# Patient Record
Sex: FEMALE | Race: WHITE | NOT HISPANIC OR LATINO | URBAN - METROPOLITAN AREA
[De-identification: names, ages, dates, MRNs, and addresses within clinical notes are randomized per-mention and may not be internally consistent; named-entity substitution may affect disease eponyms.]

---

## 2017-03-21 ENCOUNTER — EMERGENCY (EMERGENCY)
Facility: HOSPITAL | Age: 67
LOS: 0 days | Discharge: HOME | End: 2017-03-22
Admitting: INTERNAL MEDICINE

## 2017-06-27 DIAGNOSIS — F10.129 ALCOHOL ABUSE WITH INTOXICATION, UNSPECIFIED: ICD-10-CM

## 2017-06-27 DIAGNOSIS — F17.200 NICOTINE DEPENDENCE, UNSPECIFIED, UNCOMPLICATED: ICD-10-CM

## 2017-06-27 DIAGNOSIS — Z91.012 ALLERGY TO EGGS: ICD-10-CM

## 2017-06-27 DIAGNOSIS — I25.10 ATHEROSCLEROTIC HEART DISEASE OF NATIVE CORONARY ARTERY WITHOUT ANGINA PECTORIS: ICD-10-CM

## 2017-06-27 DIAGNOSIS — Z79.82 LONG TERM (CURRENT) USE OF ASPIRIN: ICD-10-CM

## 2017-06-27 DIAGNOSIS — I25.2 OLD MYOCARDIAL INFARCTION: ICD-10-CM
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
## 2017-06-27 DIAGNOSIS — I10 ESSENTIAL (PRIMARY) HYPERTENSION: ICD-10-CM

## 2017-06-27 DIAGNOSIS — F20.9 SCHIZOPHRENIA, UNSPECIFIED: ICD-10-CM

## 2017-06-27 DIAGNOSIS — E78.5 HYPERLIPIDEMIA, UNSPECIFIED: ICD-10-CM

## 2017-06-27 DIAGNOSIS — R19.7 DIARRHEA, UNSPECIFIED: ICD-10-CM

## 2017-06-27 DIAGNOSIS — D64.9 ANEMIA, UNSPECIFIED: ICD-10-CM

## 2018-06-12 NOTE — ASU PATIENT PROFILE, ADULT - PMH
Abnormal cholesterol test    Absolute anemia    Acute MI    Acute schizophrenia    CAD, multiple vessel

## 2018-06-13 ENCOUNTER — OUTPATIENT (OUTPATIENT)
Dept: OUTPATIENT SERVICES | Facility: HOSPITAL | Age: 68
LOS: 1 days | Discharge: HOME | End: 2018-06-13

## 2018-06-13 VITALS — RESPIRATION RATE: 18 BRPM | HEART RATE: 60 BPM | SYSTOLIC BLOOD PRESSURE: 100 MMHG | DIASTOLIC BLOOD PRESSURE: 53 MMHG

## 2018-06-13 VITALS
TEMPERATURE: 96 F | HEIGHT: 63 IN | DIASTOLIC BLOOD PRESSURE: 62 MMHG | SYSTOLIC BLOOD PRESSURE: 130 MMHG | HEART RATE: 69 BPM | OXYGEN SATURATION: 97 % | RESPIRATION RATE: 17 BRPM | WEIGHT: 149.91 LBS

## 2018-06-13 DIAGNOSIS — Z95.0 PRESENCE OF CARDIAC PACEMAKER: Chronic | ICD-10-CM

## 2018-06-13 RX ORDER — SODIUM CHLORIDE 9 MG/ML
1000 INJECTION, SOLUTION INTRAVENOUS
Qty: 0 | Refills: 0 | Status: DISCONTINUED | OUTPATIENT
Start: 2018-06-13 | End: 2018-06-28

## 2018-06-13 RX ORDER — ACETAMINOPHEN 500 MG
650 TABLET ORAL ONCE
Qty: 0 | Refills: 0 | Status: DISCONTINUED | OUTPATIENT
Start: 2018-06-13 | End: 2018-06-28

## 2018-06-13 RX ORDER — ONDANSETRON 8 MG/1
4 TABLET, FILM COATED ORAL ONCE
Qty: 0 | Refills: 0 | Status: DISCONTINUED | OUTPATIENT
Start: 2018-06-13 | End: 2018-06-28

## 2018-06-13 NOTE — ASU PREOP CHECKLIST - TO WHOM
RN opportunity for questions and answers rendered [facility] [place] opportunity for questions and answers rendered

## 2018-06-15 DIAGNOSIS — H26.9 UNSPECIFIED CATARACT: ICD-10-CM

## 2018-06-15 DIAGNOSIS — I25.10 ATHEROSCLEROTIC HEART DISEASE OF NATIVE CORONARY ARTERY WITHOUT ANGINA PECTORIS: ICD-10-CM

## 2018-07-30 ENCOUNTER — OUTPATIENT (OUTPATIENT)
Dept: OUTPATIENT SERVICES | Facility: HOSPITAL | Age: 68
LOS: 1 days | Discharge: HOME | End: 2018-07-30

## 2018-07-30 DIAGNOSIS — Z01.21 ENCOUNTER FOR DENTAL EXAMINATION AND CLEANING WITH ABNORMAL FINDINGS: ICD-10-CM

## 2018-07-30 DIAGNOSIS — Z95.0 PRESENCE OF CARDIAC PACEMAKER: Chronic | ICD-10-CM

## 2018-07-30 PROBLEM — I25.10 ATHEROSCLEROTIC HEART DISEASE OF NATIVE CORONARY ARTERY WITHOUT ANGINA PECTORIS: Chronic | Status: ACTIVE | Noted: 2018-06-13

## 2018-07-30 PROBLEM — F23 BRIEF PSYCHOTIC DISORDER: Chronic | Status: ACTIVE | Noted: 2018-06-13

## 2018-07-30 PROBLEM — D64.9 ANEMIA, UNSPECIFIED: Chronic | Status: ACTIVE | Noted: 2018-06-13

## 2018-07-30 PROBLEM — E78.9 DISORDER OF LIPOPROTEIN METABOLISM, UNSPECIFIED: Chronic | Status: ACTIVE | Noted: 2018-06-13

## 2018-07-30 PROBLEM — I21.9 ACUTE MYOCARDIAL INFARCTION, UNSPECIFIED: Chronic | Status: ACTIVE | Noted: 2018-06-13

## 2018-10-03 ENCOUNTER — OUTPATIENT (OUTPATIENT)
Dept: OUTPATIENT SERVICES | Facility: HOSPITAL | Age: 68
LOS: 1 days | Discharge: HOME | End: 2018-10-03

## 2018-10-03 DIAGNOSIS — Z95.0 PRESENCE OF CARDIAC PACEMAKER: Chronic | ICD-10-CM

## 2018-10-03 DIAGNOSIS — K05.4 PERIODONTOSIS: ICD-10-CM

## 2018-11-12 ENCOUNTER — OUTPATIENT (OUTPATIENT)
Dept: OUTPATIENT SERVICES | Facility: HOSPITAL | Age: 68
LOS: 1 days | Discharge: HOME | End: 2018-11-12

## 2018-11-12 DIAGNOSIS — Z95.0 PRESENCE OF CARDIAC PACEMAKER: Chronic | ICD-10-CM

## 2018-11-26 DIAGNOSIS — K08.409 PARTIAL LOSS OF TEETH, UNSPECIFIED CAUSE, UNSPECIFIED CLASS: ICD-10-CM

## 2018-12-31 ENCOUNTER — OUTPATIENT (OUTPATIENT)
Dept: OUTPATIENT SERVICES | Facility: HOSPITAL | Age: 68
LOS: 1 days | Discharge: HOME | End: 2018-12-31

## 2018-12-31 DIAGNOSIS — Z95.0 PRESENCE OF CARDIAC PACEMAKER: Chronic | ICD-10-CM

## 2018-12-31 DIAGNOSIS — K08.409 PARTIAL LOSS OF TEETH, UNSPECIFIED CAUSE, UNSPECIFIED CLASS: ICD-10-CM

## 2019-06-21 ENCOUNTER — OUTPATIENT (OUTPATIENT)
Dept: OUTPATIENT SERVICES | Facility: HOSPITAL | Age: 69
LOS: 1 days | Discharge: HOME | End: 2019-06-21

## 2019-06-21 DIAGNOSIS — Z95.0 PRESENCE OF CARDIAC PACEMAKER: Chronic | ICD-10-CM

## 2019-07-05 DIAGNOSIS — Z01.21 ENCOUNTER FOR DENTAL EXAMINATION AND CLEANING WITH ABNORMAL FINDINGS: ICD-10-CM

## 2020-11-10 ENCOUNTER — OUTPATIENT (OUTPATIENT)
Dept: OUTPATIENT SERVICES | Facility: HOSPITAL | Age: 70
LOS: 1 days | Discharge: HOME | End: 2020-11-10

## 2020-11-10 DIAGNOSIS — Z95.0 PRESENCE OF CARDIAC PACEMAKER: Chronic | ICD-10-CM

## 2020-11-23 ENCOUNTER — OUTPATIENT (OUTPATIENT)
Dept: OUTPATIENT SERVICES | Facility: HOSPITAL | Age: 70
LOS: 1 days | Discharge: HOME | End: 2020-11-23

## 2020-11-23 DIAGNOSIS — Z95.0 PRESENCE OF CARDIAC PACEMAKER: Chronic | ICD-10-CM

## 2020-12-07 ENCOUNTER — OUTPATIENT (OUTPATIENT)
Dept: OUTPATIENT SERVICES | Facility: HOSPITAL | Age: 70
LOS: 1 days | Discharge: HOME | End: 2020-12-07

## 2020-12-07 DIAGNOSIS — Z95.0 PRESENCE OF CARDIAC PACEMAKER: Chronic | ICD-10-CM

## 2020-12-07 DIAGNOSIS — K05.6 PERIODONTAL DISEASE, UNSPECIFIED: ICD-10-CM

## 2021-01-04 ENCOUNTER — OUTPATIENT (OUTPATIENT)
Dept: OUTPATIENT SERVICES | Facility: HOSPITAL | Age: 71
LOS: 1 days | Discharge: HOME | End: 2021-01-04

## 2021-01-04 DIAGNOSIS — Z95.0 PRESENCE OF CARDIAC PACEMAKER: Chronic | ICD-10-CM

## 2021-01-04 DIAGNOSIS — K08.109 COMPLETE LOSS OF TEETH, UNSPECIFIED CAUSE, UNSPECIFIED CLASS: ICD-10-CM

## 2021-03-01 ENCOUNTER — OUTPATIENT (OUTPATIENT)
Dept: OUTPATIENT SERVICES | Facility: HOSPITAL | Age: 71
LOS: 1 days | Discharge: HOME | End: 2021-03-01

## 2021-03-01 DIAGNOSIS — Z95.0 PRESENCE OF CARDIAC PACEMAKER: Chronic | ICD-10-CM

## 2021-03-18 ENCOUNTER — OUTPATIENT (OUTPATIENT)
Dept: OUTPATIENT SERVICES | Facility: HOSPITAL | Age: 71
LOS: 1 days | Discharge: HOME | End: 2021-03-18
Payer: MEDICARE

## 2021-03-18 DIAGNOSIS — Z95.0 PRESENCE OF CARDIAC PACEMAKER: Chronic | ICD-10-CM

## 2021-03-18 PROCEDURE — 78803 RP LOCLZJ TUM SPECT 1 AREA: CPT | Mod: 26

## 2021-03-30 DIAGNOSIS — G25.0 ESSENTIAL TREMOR: ICD-10-CM

## 2021-04-19 ENCOUNTER — OUTPATIENT (OUTPATIENT)
Dept: OUTPATIENT SERVICES | Facility: HOSPITAL | Age: 71
LOS: 1 days | Discharge: HOME | End: 2021-04-19

## 2021-04-19 DIAGNOSIS — Z95.0 PRESENCE OF CARDIAC PACEMAKER: Chronic | ICD-10-CM

## 2021-04-20 DIAGNOSIS — K08.109 COMPLETE LOSS OF TEETH, UNSPECIFIED CAUSE, UNSPECIFIED CLASS: ICD-10-CM

## 2021-05-17 ENCOUNTER — OUTPATIENT (OUTPATIENT)
Dept: OUTPATIENT SERVICES | Facility: HOSPITAL | Age: 71
LOS: 1 days | Discharge: HOME | End: 2021-05-17

## 2021-05-17 DIAGNOSIS — Z95.0 PRESENCE OF CARDIAC PACEMAKER: Chronic | ICD-10-CM

## 2021-05-18 DIAGNOSIS — K08.409 PARTIAL LOSS OF TEETH, UNSPECIFIED CAUSE, UNSPECIFIED CLASS: ICD-10-CM

## 2021-08-23 ENCOUNTER — OUTPATIENT (OUTPATIENT)
Dept: OUTPATIENT SERVICES | Facility: HOSPITAL | Age: 71
LOS: 1 days | Discharge: HOME | End: 2021-08-23

## 2021-08-23 DIAGNOSIS — Z95.0 PRESENCE OF CARDIAC PACEMAKER: Chronic | ICD-10-CM

## 2021-11-29 ENCOUNTER — OUTPATIENT (OUTPATIENT)
Dept: OUTPATIENT SERVICES | Facility: HOSPITAL | Age: 71
LOS: 1 days | Discharge: HOME | End: 2021-11-29

## 2021-11-29 DIAGNOSIS — Z95.0 PRESENCE OF CARDIAC PACEMAKER: Chronic | ICD-10-CM

## 2023-01-01 ENCOUNTER — INPATIENT (INPATIENT)
Facility: HOSPITAL | Age: 73
LOS: 8 days | Discharge: NURSING, REHAB OR LONG-TERM CARE | DRG: 258 | End: 2023-09-24
Attending: STUDENT IN AN ORGANIZED HEALTH CARE EDUCATION/TRAINING PROGRAM | Admitting: STUDENT IN AN ORGANIZED HEALTH CARE EDUCATION/TRAINING PROGRAM
Payer: MEDICARE

## 2023-01-01 ENCOUNTER — APPOINTMENT (OUTPATIENT)
Dept: NEPHROLOGY | Facility: CLINIC | Age: 73
End: 2023-01-01

## 2023-01-01 ENCOUNTER — TRANSCRIPTION ENCOUNTER (OUTPATIENT)
Age: 73
End: 2023-01-01

## 2023-01-01 ENCOUNTER — APPOINTMENT (OUTPATIENT)
Dept: ELECTROPHYSIOLOGY | Facility: CLINIC | Age: 73
End: 2023-01-01

## 2023-01-01 ENCOUNTER — EMERGENCY (EMERGENCY)
Facility: HOSPITAL | Age: 73
LOS: 0 days | Discharge: HOME | End: 2023-09-07
Attending: EMERGENCY MEDICINE
Payer: MEDICARE

## 2023-01-01 VITALS
OXYGEN SATURATION: 100 % | DIASTOLIC BLOOD PRESSURE: 97 MMHG | RESPIRATION RATE: 18 BRPM | SYSTOLIC BLOOD PRESSURE: 142 MMHG | HEART RATE: 69 BPM

## 2023-01-01 VITALS
DIASTOLIC BLOOD PRESSURE: 89 MMHG | RESPIRATION RATE: 19 BRPM | HEART RATE: 63 BPM | TEMPERATURE: 97 F | SYSTOLIC BLOOD PRESSURE: 137 MMHG

## 2023-01-01 VITALS
RESPIRATION RATE: 18 BRPM | HEIGHT: 62 IN | SYSTOLIC BLOOD PRESSURE: 159 MMHG | TEMPERATURE: 98 F | DIASTOLIC BLOOD PRESSURE: 72 MMHG | WEIGHT: 143.96 LBS | HEART RATE: 69 BPM | OXYGEN SATURATION: 100 %

## 2023-01-01 VITALS
DIASTOLIC BLOOD PRESSURE: 78 MMHG | HEIGHT: 62 IN | HEART RATE: 65 BPM | WEIGHT: 149.91 LBS | OXYGEN SATURATION: 100 % | SYSTOLIC BLOOD PRESSURE: 123 MMHG | TEMPERATURE: 98 F | RESPIRATION RATE: 18 BRPM

## 2023-01-01 DIAGNOSIS — Z79.82 LONG TERM (CURRENT) USE OF ASPIRIN: ICD-10-CM

## 2023-01-01 DIAGNOSIS — I10 ESSENTIAL (PRIMARY) HYPERTENSION: ICD-10-CM

## 2023-01-01 DIAGNOSIS — Z20.822 CONTACT WITH AND (SUSPECTED) EXPOSURE TO COVID-19: ICD-10-CM

## 2023-01-01 DIAGNOSIS — Z95.0 PRESENCE OF CARDIAC PACEMAKER: Chronic | ICD-10-CM

## 2023-01-01 DIAGNOSIS — F20.9 SCHIZOPHRENIA, UNSPECIFIED: ICD-10-CM

## 2023-01-01 DIAGNOSIS — Z91.012 ALLERGY TO EGGS: ICD-10-CM

## 2023-01-01 DIAGNOSIS — I42.9 CARDIOMYOPATHY, UNSPECIFIED: ICD-10-CM

## 2023-01-01 DIAGNOSIS — Z86.2 PERSONAL HISTORY OF DISEASES OF THE BLOOD AND BLOOD-FORMING ORGANS AND CERTAIN DISORDERS INVOLVING THE IMMUNE MECHANISM: ICD-10-CM

## 2023-01-01 DIAGNOSIS — F17.200 NICOTINE DEPENDENCE, UNSPECIFIED, UNCOMPLICATED: ICD-10-CM

## 2023-01-01 DIAGNOSIS — I25.10 ATHEROSCLEROTIC HEART DISEASE OF NATIVE CORONARY ARTERY WITHOUT ANGINA PECTORIS: ICD-10-CM

## 2023-01-01 DIAGNOSIS — E87.20 ACIDOSIS, UNSPECIFIED: ICD-10-CM

## 2023-01-01 DIAGNOSIS — Z95.0 PRESENCE OF CARDIAC PACEMAKER: ICD-10-CM

## 2023-01-01 DIAGNOSIS — E78.5 HYPERLIPIDEMIA, UNSPECIFIED: ICD-10-CM

## 2023-01-01 DIAGNOSIS — I13.0 HYPERTENSIVE HEART AND CHRONIC KIDNEY DISEASE WITH HEART FAILURE AND STAGE 1 THROUGH STAGE 4 CHRONIC KIDNEY DISEASE, OR UNSPECIFIED CHRONIC KIDNEY DISEASE: ICD-10-CM

## 2023-01-01 DIAGNOSIS — E83.42 HYPOMAGNESEMIA: ICD-10-CM

## 2023-01-01 DIAGNOSIS — E87.1 HYPO-OSMOLALITY AND HYPONATREMIA: ICD-10-CM

## 2023-01-01 DIAGNOSIS — N18.30 CHRONIC KIDNEY DISEASE, STAGE 3 UNSPECIFIED: ICD-10-CM

## 2023-01-01 DIAGNOSIS — I50.21 ACUTE SYSTOLIC (CONGESTIVE) HEART FAILURE: ICD-10-CM

## 2023-01-01 DIAGNOSIS — J84.9 INTERSTITIAL PULMONARY DISEASE, UNSPECIFIED: ICD-10-CM

## 2023-01-01 DIAGNOSIS — I50.9 HEART FAILURE, UNSPECIFIED: ICD-10-CM

## 2023-01-01 LAB
ALBUMIN SERPL ELPH-MCNC: 3.1 G/DL — LOW (ref 3.5–5.2)
ALBUMIN SERPL ELPH-MCNC: 3.2 G/DL — LOW (ref 3.5–5.2)
ALBUMIN SERPL ELPH-MCNC: 3.2 G/DL — LOW (ref 3.5–5.2)
ALBUMIN SERPL ELPH-MCNC: 3.4 G/DL — LOW (ref 3.5–5.2)
ALBUMIN SERPL ELPH-MCNC: 3.4 G/DL — LOW (ref 3.5–5.2)
ALBUMIN SERPL ELPH-MCNC: 3.5 G/DL — SIGNIFICANT CHANGE UP (ref 3.5–5.2)
ALBUMIN SERPL ELPH-MCNC: 3.6 G/DL — SIGNIFICANT CHANGE UP (ref 3.5–5.2)
ALBUMIN SERPL ELPH-MCNC: 3.7 G/DL — SIGNIFICANT CHANGE UP (ref 3.5–5.2)
ALBUMIN SERPL ELPH-MCNC: 3.7 G/DL — SIGNIFICANT CHANGE UP (ref 3.5–5.2)
ALP SERPL-CCNC: 120 U/L — HIGH (ref 30–115)
ALP SERPL-CCNC: 133 U/L — HIGH (ref 30–115)
ALP SERPL-CCNC: 138 U/L — HIGH (ref 30–115)
ALP SERPL-CCNC: 140 U/L — HIGH (ref 30–115)
ALP SERPL-CCNC: 142 U/L — HIGH (ref 30–115)
ALP SERPL-CCNC: 154 U/L — HIGH (ref 30–115)
ALP SERPL-CCNC: 154 U/L — HIGH (ref 30–115)
ALP SERPL-CCNC: 156 U/L — HIGH (ref 30–115)
ALP SERPL-CCNC: 179 U/L — HIGH (ref 30–115)
ALT FLD-CCNC: 10 U/L — SIGNIFICANT CHANGE UP (ref 0–41)
ALT FLD-CCNC: 11 U/L — SIGNIFICANT CHANGE UP (ref 0–41)
ALT FLD-CCNC: 14 U/L — SIGNIFICANT CHANGE UP (ref 0–41)
ALT FLD-CCNC: 17 U/L — SIGNIFICANT CHANGE UP (ref 0–41)
ALT FLD-CCNC: 24 U/L — SIGNIFICANT CHANGE UP (ref 0–41)
ALT FLD-CCNC: 7 U/L — SIGNIFICANT CHANGE UP (ref 0–41)
ALT FLD-CCNC: 8 U/L — SIGNIFICANT CHANGE UP (ref 0–41)
ANION GAP SERPL CALC-SCNC: 11 MMOL/L — SIGNIFICANT CHANGE UP (ref 7–14)
ANION GAP SERPL CALC-SCNC: 11 MMOL/L — SIGNIFICANT CHANGE UP (ref 7–14)
ANION GAP SERPL CALC-SCNC: 12 MMOL/L — SIGNIFICANT CHANGE UP (ref 7–14)
ANION GAP SERPL CALC-SCNC: 13 MMOL/L — SIGNIFICANT CHANGE UP (ref 7–14)
ANION GAP SERPL CALC-SCNC: 15 MMOL/L — HIGH (ref 7–14)
ANION GAP SERPL CALC-SCNC: 18 MMOL/L — HIGH (ref 7–14)
ANION GAP SERPL CALC-SCNC: 19 MMOL/L — HIGH (ref 7–14)
ANION GAP SERPL CALC-SCNC: 9 MMOL/L — SIGNIFICANT CHANGE UP (ref 7–14)
AST SERPL-CCNC: 22 U/L — SIGNIFICANT CHANGE UP (ref 0–41)
AST SERPL-CCNC: 27 U/L — SIGNIFICANT CHANGE UP (ref 0–41)
AST SERPL-CCNC: 29 U/L — SIGNIFICANT CHANGE UP (ref 0–41)
AST SERPL-CCNC: 30 U/L — SIGNIFICANT CHANGE UP (ref 0–41)
AST SERPL-CCNC: 35 U/L — SIGNIFICANT CHANGE UP (ref 0–41)
AST SERPL-CCNC: 37 U/L — SIGNIFICANT CHANGE UP (ref 0–41)
AST SERPL-CCNC: 40 U/L — SIGNIFICANT CHANGE UP (ref 0–41)
AST SERPL-CCNC: 42 U/L — HIGH (ref 0–41)
AST SERPL-CCNC: 59 U/L — HIGH (ref 0–41)
BASE EXCESS BLDV CALC-SCNC: 2.5 MMOL/L — SIGNIFICANT CHANGE UP (ref -2–3)
BASOPHILS # BLD AUTO: 0 K/UL — SIGNIFICANT CHANGE UP (ref 0–0.2)
BASOPHILS # BLD AUTO: 0.04 K/UL — SIGNIFICANT CHANGE UP (ref 0–0.2)
BASOPHILS # BLD AUTO: 0.05 K/UL — SIGNIFICANT CHANGE UP (ref 0–0.2)
BASOPHILS # BLD AUTO: 0.07 K/UL — SIGNIFICANT CHANGE UP (ref 0–0.2)
BASOPHILS # BLD AUTO: 0.08 K/UL — SIGNIFICANT CHANGE UP (ref 0–0.2)
BASOPHILS # BLD AUTO: 0.08 K/UL — SIGNIFICANT CHANGE UP (ref 0–0.2)
BASOPHILS NFR BLD AUTO: 0 % — SIGNIFICANT CHANGE UP (ref 0–1)
BASOPHILS NFR BLD AUTO: 0.6 % — SIGNIFICANT CHANGE UP (ref 0–1)
BASOPHILS NFR BLD AUTO: 0.7 % — SIGNIFICANT CHANGE UP (ref 0–1)
BASOPHILS NFR BLD AUTO: 0.7 % — SIGNIFICANT CHANGE UP (ref 0–1)
BASOPHILS NFR BLD AUTO: 0.8 % — SIGNIFICANT CHANGE UP (ref 0–1)
BASOPHILS NFR BLD AUTO: 0.9 % — SIGNIFICANT CHANGE UP (ref 0–1)
BASOPHILS NFR BLD AUTO: 1.3 % — HIGH (ref 0–1)
BASOPHILS NFR BLD AUTO: 1.3 % — HIGH (ref 0–1)
BASOPHILS NFR BLD AUTO: 1.4 % — HIGH (ref 0–1)
BILIRUB SERPL-MCNC: 1.4 MG/DL — HIGH (ref 0.2–1.2)
BILIRUB SERPL-MCNC: 1.6 MG/DL — HIGH (ref 0.2–1.2)
BILIRUB SERPL-MCNC: 1.8 MG/DL — HIGH (ref 0.2–1.2)
BILIRUB SERPL-MCNC: 1.8 MG/DL — HIGH (ref 0.2–1.2)
BILIRUB SERPL-MCNC: 1.9 MG/DL — HIGH (ref 0.2–1.2)
BILIRUB SERPL-MCNC: 2.2 MG/DL — HIGH (ref 0.2–1.2)
BILIRUB SERPL-MCNC: 2.2 MG/DL — HIGH (ref 0.2–1.2)
BILIRUB SERPL-MCNC: 2.3 MG/DL — HIGH (ref 0.2–1.2)
BILIRUB SERPL-MCNC: 3.2 MG/DL — HIGH (ref 0.2–1.2)
BUN SERPL-MCNC: 13 MG/DL — SIGNIFICANT CHANGE UP (ref 10–20)
BUN SERPL-MCNC: 13 MG/DL — SIGNIFICANT CHANGE UP (ref 10–20)
BUN SERPL-MCNC: 14 MG/DL — SIGNIFICANT CHANGE UP (ref 10–20)
BUN SERPL-MCNC: 14 MG/DL — SIGNIFICANT CHANGE UP (ref 10–20)
BUN SERPL-MCNC: 15 MG/DL — SIGNIFICANT CHANGE UP (ref 10–20)
BUN SERPL-MCNC: 15 MG/DL — SIGNIFICANT CHANGE UP (ref 10–20)
BUN SERPL-MCNC: 17 MG/DL — SIGNIFICANT CHANGE UP (ref 10–20)
BUN SERPL-MCNC: 18 MG/DL — SIGNIFICANT CHANGE UP (ref 10–20)
BUN SERPL-MCNC: 19 MG/DL — SIGNIFICANT CHANGE UP (ref 10–20)
BUN SERPL-MCNC: 22 MG/DL — HIGH (ref 10–20)
BUN SERPL-MCNC: 24 MG/DL — HIGH (ref 10–20)
BUN SERPL-MCNC: 25 MG/DL — HIGH (ref 10–20)
BUN SERPL-MCNC: 27 MG/DL — HIGH (ref 10–20)
BUN SERPL-MCNC: 28 MG/DL — HIGH (ref 10–20)
CA-I SERPL-SCNC: 1.12 MMOL/L — LOW (ref 1.15–1.33)
CALCIUM SERPL-MCNC: 7.9 MG/DL — LOW (ref 8.4–10.5)
CALCIUM SERPL-MCNC: 8.3 MG/DL — LOW (ref 8.4–10.5)
CALCIUM SERPL-MCNC: 8.4 MG/DL — SIGNIFICANT CHANGE UP (ref 8.4–10.5)
CALCIUM SERPL-MCNC: 8.4 MG/DL — SIGNIFICANT CHANGE UP (ref 8.4–10.5)
CALCIUM SERPL-MCNC: 8.5 MG/DL — SIGNIFICANT CHANGE UP (ref 8.4–10.5)
CALCIUM SERPL-MCNC: 8.6 MG/DL — SIGNIFICANT CHANGE UP (ref 8.4–10.4)
CALCIUM SERPL-MCNC: 8.6 MG/DL — SIGNIFICANT CHANGE UP (ref 8.4–10.5)
CALCIUM SERPL-MCNC: 8.7 MG/DL — SIGNIFICANT CHANGE UP (ref 8.4–10.5)
CALCIUM SERPL-MCNC: 8.7 MG/DL — SIGNIFICANT CHANGE UP (ref 8.4–10.5)
CALCIUM SERPL-MCNC: 8.8 MG/DL — SIGNIFICANT CHANGE UP (ref 8.4–10.5)
CALCIUM SERPL-MCNC: 9 MG/DL — SIGNIFICANT CHANGE UP (ref 8.4–10.4)
CALCIUM SERPL-MCNC: 9 MG/DL — SIGNIFICANT CHANGE UP (ref 8.4–10.5)
CALCIUM SERPL-MCNC: 9 MG/DL — SIGNIFICANT CHANGE UP (ref 8.4–10.5)
CALCIUM SERPL-MCNC: 9.3 MG/DL — SIGNIFICANT CHANGE UP (ref 8.4–10.4)
CHLORIDE SERPL-SCNC: 100 MMOL/L — SIGNIFICANT CHANGE UP (ref 98–110)
CHLORIDE SERPL-SCNC: 87 MMOL/L — LOW (ref 98–110)
CHLORIDE SERPL-SCNC: 90 MMOL/L — LOW (ref 98–110)
CHLORIDE SERPL-SCNC: 91 MMOL/L — LOW (ref 98–110)
CHLORIDE SERPL-SCNC: 92 MMOL/L — LOW (ref 98–110)
CHLORIDE SERPL-SCNC: 93 MMOL/L — LOW (ref 98–110)
CHLORIDE SERPL-SCNC: 95 MMOL/L — LOW (ref 98–110)
CHLORIDE SERPL-SCNC: 96 MMOL/L — LOW (ref 98–110)
CHLORIDE SERPL-SCNC: 96 MMOL/L — LOW (ref 98–110)
CHLORIDE SERPL-SCNC: 97 MMOL/L — LOW (ref 98–110)
CHOLEST SERPL-MCNC: 73 MG/DL — SIGNIFICANT CHANGE UP
CO2 SERPL-SCNC: 23 MMOL/L — SIGNIFICANT CHANGE UP (ref 17–32)
CO2 SERPL-SCNC: 24 MMOL/L — SIGNIFICANT CHANGE UP (ref 17–32)
CO2 SERPL-SCNC: 25 MMOL/L — SIGNIFICANT CHANGE UP (ref 17–32)
CO2 SERPL-SCNC: 26 MMOL/L — SIGNIFICANT CHANGE UP (ref 17–32)
CO2 SERPL-SCNC: 27 MMOL/L — SIGNIFICANT CHANGE UP (ref 17–32)
CO2 SERPL-SCNC: 28 MMOL/L — SIGNIFICANT CHANGE UP (ref 17–32)
CO2 SERPL-SCNC: 30 MMOL/L — SIGNIFICANT CHANGE UP (ref 17–32)
CO2 SERPL-SCNC: 31 MMOL/L — SIGNIFICANT CHANGE UP (ref 17–32)
CO2 SERPL-SCNC: 33 MMOL/L — HIGH (ref 17–32)
CO2 SERPL-SCNC: 33 MMOL/L — HIGH (ref 17–32)
CREAT SERPL-MCNC: 0.7 MG/DL — SIGNIFICANT CHANGE UP (ref 0.7–1.5)
CREAT SERPL-MCNC: 0.8 MG/DL — SIGNIFICANT CHANGE UP (ref 0.7–1.5)
CREAT SERPL-MCNC: 0.9 MG/DL — SIGNIFICANT CHANGE UP (ref 0.7–1.5)
CREAT SERPL-MCNC: 0.9 MG/DL — SIGNIFICANT CHANGE UP (ref 0.7–1.5)
CREAT SERPL-MCNC: 1 MG/DL — SIGNIFICANT CHANGE UP (ref 0.7–1.5)
CREAT SERPL-MCNC: 1 MG/DL — SIGNIFICANT CHANGE UP (ref 0.7–1.5)
CREAT SERPL-MCNC: 1.1 MG/DL — SIGNIFICANT CHANGE UP (ref 0.7–1.5)
CREAT SERPL-MCNC: 1.2 MG/DL — SIGNIFICANT CHANGE UP (ref 0.7–1.5)
CREAT SERPL-MCNC: 1.3 MG/DL — SIGNIFICANT CHANGE UP (ref 0.7–1.5)
CREAT SERPL-MCNC: 1.3 MG/DL — SIGNIFICANT CHANGE UP (ref 0.7–1.5)
EGFR: 44 ML/MIN/1.73M2 — LOW
EGFR: 44 ML/MIN/1.73M2 — LOW
EGFR: 48 ML/MIN/1.73M2 — LOW
EGFR: 53 ML/MIN/1.73M2 — LOW
EGFR: 60 ML/MIN/1.73M2 — SIGNIFICANT CHANGE UP
EGFR: 60 ML/MIN/1.73M2 — SIGNIFICANT CHANGE UP
EGFR: 68 ML/MIN/1.73M2 — SIGNIFICANT CHANGE UP
EGFR: 68 ML/MIN/1.73M2 — SIGNIFICANT CHANGE UP
EGFR: 78 ML/MIN/1.73M2 — SIGNIFICANT CHANGE UP
EGFR: 92 ML/MIN/1.73M2 — SIGNIFICANT CHANGE UP
EOSINOPHIL # BLD AUTO: 0.08 K/UL — SIGNIFICANT CHANGE UP (ref 0–0.7)
EOSINOPHIL # BLD AUTO: 0.1 K/UL — SIGNIFICANT CHANGE UP (ref 0–0.7)
EOSINOPHIL # BLD AUTO: 0.1 K/UL — SIGNIFICANT CHANGE UP (ref 0–0.7)
EOSINOPHIL # BLD AUTO: 0.12 K/UL — SIGNIFICANT CHANGE UP (ref 0–0.7)
EOSINOPHIL # BLD AUTO: 0.12 K/UL — SIGNIFICANT CHANGE UP (ref 0–0.7)
EOSINOPHIL # BLD AUTO: 0.13 K/UL — SIGNIFICANT CHANGE UP (ref 0–0.7)
EOSINOPHIL # BLD AUTO: 0.14 K/UL — SIGNIFICANT CHANGE UP (ref 0–0.7)
EOSINOPHIL # BLD AUTO: 0.17 K/UL — SIGNIFICANT CHANGE UP (ref 0–0.7)
EOSINOPHIL # BLD AUTO: 0.19 K/UL — SIGNIFICANT CHANGE UP (ref 0–0.7)
EOSINOPHIL NFR BLD AUTO: 0.8 % — SIGNIFICANT CHANGE UP (ref 0–8)
EOSINOPHIL NFR BLD AUTO: 1.3 % — SIGNIFICANT CHANGE UP (ref 0–8)
EOSINOPHIL NFR BLD AUTO: 1.6 % — SIGNIFICANT CHANGE UP (ref 0–8)
EOSINOPHIL NFR BLD AUTO: 1.7 % — SIGNIFICANT CHANGE UP (ref 0–8)
EOSINOPHIL NFR BLD AUTO: 1.9 % — SIGNIFICANT CHANGE UP (ref 0–8)
EOSINOPHIL NFR BLD AUTO: 1.9 % — SIGNIFICANT CHANGE UP (ref 0–8)
EOSINOPHIL NFR BLD AUTO: 2.5 % — SIGNIFICANT CHANGE UP (ref 0–8)
EOSINOPHIL NFR BLD AUTO: 2.6 % — SIGNIFICANT CHANGE UP (ref 0–8)
EOSINOPHIL NFR BLD AUTO: 3.3 % — SIGNIFICANT CHANGE UP (ref 0–8)
GAS PNL BLDV: 131 MMOL/L — LOW (ref 136–145)
GAS PNL BLDV: SIGNIFICANT CHANGE UP
GAS PNL BLDV: SIGNIFICANT CHANGE UP
GLUCOSE SERPL-MCNC: 108 MG/DL — HIGH (ref 70–99)
GLUCOSE SERPL-MCNC: 113 MG/DL — HIGH (ref 70–99)
GLUCOSE SERPL-MCNC: 118 MG/DL — HIGH (ref 70–99)
GLUCOSE SERPL-MCNC: 120 MG/DL — HIGH (ref 70–99)
GLUCOSE SERPL-MCNC: 125 MG/DL — HIGH (ref 70–99)
GLUCOSE SERPL-MCNC: 127 MG/DL — HIGH (ref 70–99)
GLUCOSE SERPL-MCNC: 129 MG/DL — HIGH (ref 70–99)
GLUCOSE SERPL-MCNC: 130 MG/DL — HIGH (ref 70–99)
GLUCOSE SERPL-MCNC: 131 MG/DL — HIGH (ref 70–99)
GLUCOSE SERPL-MCNC: 131 MG/DL — HIGH (ref 70–99)
GLUCOSE SERPL-MCNC: 132 MG/DL — HIGH (ref 70–99)
GLUCOSE SERPL-MCNC: 134 MG/DL — HIGH (ref 70–99)
GLUCOSE SERPL-MCNC: 135 MG/DL — HIGH (ref 70–99)
GLUCOSE SERPL-MCNC: 78 MG/DL — SIGNIFICANT CHANGE UP (ref 70–99)
HCO3 BLDV-SCNC: 28 MMOL/L — SIGNIFICANT CHANGE UP (ref 22–29)
HCT VFR BLD CALC: 44 % — SIGNIFICANT CHANGE UP (ref 37–47)
HCT VFR BLD CALC: 44.3 % — SIGNIFICANT CHANGE UP (ref 37–47)
HCT VFR BLD CALC: 44.5 % — SIGNIFICANT CHANGE UP (ref 37–47)
HCT VFR BLD CALC: 45.2 % — SIGNIFICANT CHANGE UP (ref 37–47)
HCT VFR BLD CALC: 45.6 % — SIGNIFICANT CHANGE UP (ref 37–47)
HCT VFR BLD CALC: 45.9 % — SIGNIFICANT CHANGE UP (ref 37–47)
HCT VFR BLD CALC: 46.3 % — SIGNIFICANT CHANGE UP (ref 37–47)
HCT VFR BLD CALC: 47 % — SIGNIFICANT CHANGE UP (ref 37–47)
HCT VFR BLD CALC: 47.8 % — HIGH (ref 37–47)
HCT VFR BLD CALC: 49 % — HIGH (ref 37–47)
HCT VFR BLDA CALC: 45 % — SIGNIFICANT CHANGE UP (ref 39–51)
HCV AB S/CO SERPL IA: 0.04 COI — SIGNIFICANT CHANGE UP
HCV AB SERPL-IMP: SIGNIFICANT CHANGE UP
HDLC SERPL-MCNC: 28 MG/DL — LOW
HGB BLD CALC-MCNC: 14.9 G/DL — SIGNIFICANT CHANGE UP (ref 12.6–17.4)
HGB BLD-MCNC: 14.3 G/DL — SIGNIFICANT CHANGE UP (ref 12–16)
HGB BLD-MCNC: 14.5 G/DL — SIGNIFICANT CHANGE UP (ref 12–16)
HGB BLD-MCNC: 14.6 G/DL — SIGNIFICANT CHANGE UP (ref 12–16)
HGB BLD-MCNC: 14.8 G/DL — SIGNIFICANT CHANGE UP (ref 12–16)
HGB BLD-MCNC: 14.9 G/DL — SIGNIFICANT CHANGE UP (ref 12–16)
HGB BLD-MCNC: 14.9 G/DL — SIGNIFICANT CHANGE UP (ref 12–16)
HGB BLD-MCNC: 15.2 G/DL — SIGNIFICANT CHANGE UP (ref 12–16)
HGB BLD-MCNC: 15.2 G/DL — SIGNIFICANT CHANGE UP (ref 12–16)
HGB BLD-MCNC: 15.5 G/DL — SIGNIFICANT CHANGE UP (ref 12–16)
HGB BLD-MCNC: 15.9 G/DL — SIGNIFICANT CHANGE UP (ref 12–16)
IMM GRANULOCYTES NFR BLD AUTO: 0.3 % — SIGNIFICANT CHANGE UP (ref 0.1–0.3)
IMM GRANULOCYTES NFR BLD AUTO: 0.3 % — SIGNIFICANT CHANGE UP (ref 0.1–0.3)
IMM GRANULOCYTES NFR BLD AUTO: 0.4 % — HIGH (ref 0.1–0.3)
IMM GRANULOCYTES NFR BLD AUTO: 0.4 % — HIGH (ref 0.1–0.3)
IMM GRANULOCYTES NFR BLD AUTO: 0.5 % — HIGH (ref 0.1–0.3)
IMM GRANULOCYTES NFR BLD AUTO: 0.6 % — HIGH (ref 0.1–0.3)
IMM GRANULOCYTES NFR BLD AUTO: 0.7 % — HIGH (ref 0.1–0.3)
IMM GRANULOCYTES NFR BLD AUTO: 1 % — HIGH (ref 0.1–0.3)
LACTATE BLDV-MCNC: 2.8 MMOL/L — HIGH (ref 0.5–2)
LACTATE SERPL-SCNC: 1.7 MMOL/L — SIGNIFICANT CHANGE UP (ref 0.7–2)
LACTATE SERPL-SCNC: 1.7 MMOL/L — SIGNIFICANT CHANGE UP (ref 0.7–2)
LACTATE SERPL-SCNC: 2.6 MMOL/L — HIGH (ref 0.7–2)
LACTATE SERPL-SCNC: 3.7 MMOL/L — HIGH (ref 0.7–2)
LACTATE SERPL-SCNC: 3.7 MMOL/L — HIGH (ref 0.7–2)
LIPID PNL WITH DIRECT LDL SERPL: 32 MG/DL — SIGNIFICANT CHANGE UP
LYMPHOCYTES # BLD AUTO: 0.98 K/UL — LOW (ref 1.2–3.4)
LYMPHOCYTES # BLD AUTO: 1.43 K/UL — SIGNIFICANT CHANGE UP (ref 1.2–3.4)
LYMPHOCYTES # BLD AUTO: 1.51 K/UL — SIGNIFICANT CHANGE UP (ref 1.2–3.4)
LYMPHOCYTES # BLD AUTO: 1.54 K/UL — SIGNIFICANT CHANGE UP (ref 1.2–3.4)
LYMPHOCYTES # BLD AUTO: 1.58 K/UL — SIGNIFICANT CHANGE UP (ref 1.2–3.4)
LYMPHOCYTES # BLD AUTO: 1.61 K/UL — SIGNIFICANT CHANGE UP (ref 1.2–3.4)
LYMPHOCYTES # BLD AUTO: 1.63 K/UL — SIGNIFICANT CHANGE UP (ref 1.2–3.4)
LYMPHOCYTES # BLD AUTO: 1.66 K/UL — SIGNIFICANT CHANGE UP (ref 1.2–3.4)
LYMPHOCYTES # BLD AUTO: 1.8 K/UL — SIGNIFICANT CHANGE UP (ref 1.2–3.4)
LYMPHOCYTES # BLD AUTO: 14.8 % — LOW (ref 20.5–51.1)
LYMPHOCYTES # BLD AUTO: 19.5 % — LOW (ref 20.5–51.1)
LYMPHOCYTES # BLD AUTO: 20.2 % — LOW (ref 20.5–51.1)
LYMPHOCYTES # BLD AUTO: 20.6 % — SIGNIFICANT CHANGE UP (ref 20.5–51.1)
LYMPHOCYTES # BLD AUTO: 21.8 % — SIGNIFICANT CHANGE UP (ref 20.5–51.1)
LYMPHOCYTES # BLD AUTO: 22.6 % — SIGNIFICANT CHANGE UP (ref 20.5–51.1)
LYMPHOCYTES # BLD AUTO: 26.7 % — SIGNIFICANT CHANGE UP (ref 20.5–51.1)
LYMPHOCYTES # BLD AUTO: 29 % — SIGNIFICANT CHANGE UP (ref 20.5–51.1)
LYMPHOCYTES # BLD AUTO: 30.1 % — SIGNIFICANT CHANGE UP (ref 20.5–51.1)
MAGNESIUM SERPL-MCNC: 1.6 MG/DL — LOW (ref 1.8–2.4)
MAGNESIUM SERPL-MCNC: 1.7 MG/DL — LOW (ref 1.8–2.4)
MAGNESIUM SERPL-MCNC: 1.8 MG/DL — SIGNIFICANT CHANGE UP (ref 1.8–2.4)
MAGNESIUM SERPL-MCNC: 1.9 MG/DL — SIGNIFICANT CHANGE UP (ref 1.8–2.4)
MAGNESIUM SERPL-MCNC: 2 MG/DL — SIGNIFICANT CHANGE UP (ref 1.8–2.4)
MAGNESIUM SERPL-MCNC: 2.1 MG/DL — SIGNIFICANT CHANGE UP (ref 1.8–2.4)
MAGNESIUM SERPL-MCNC: 2.3 MG/DL — SIGNIFICANT CHANGE UP (ref 1.8–2.4)
MAGNESIUM SERPL-MCNC: 2.5 MG/DL — HIGH (ref 1.8–2.4)
MCHC RBC-ENTMCNC: 27.8 PG — SIGNIFICANT CHANGE UP (ref 27–31)
MCHC RBC-ENTMCNC: 27.8 PG — SIGNIFICANT CHANGE UP (ref 27–31)
MCHC RBC-ENTMCNC: 28.1 PG — SIGNIFICANT CHANGE UP (ref 27–31)
MCHC RBC-ENTMCNC: 28.2 PG — SIGNIFICANT CHANGE UP (ref 27–31)
MCHC RBC-ENTMCNC: 28.5 PG — SIGNIFICANT CHANGE UP (ref 27–31)
MCHC RBC-ENTMCNC: 28.6 PG — SIGNIFICANT CHANGE UP (ref 27–31)
MCHC RBC-ENTMCNC: 28.6 PG — SIGNIFICANT CHANGE UP (ref 27–31)
MCHC RBC-ENTMCNC: 28.7 PG — SIGNIFICANT CHANGE UP (ref 27–31)
MCHC RBC-ENTMCNC: 28.7 PG — SIGNIFICANT CHANGE UP (ref 27–31)
MCHC RBC-ENTMCNC: 29.1 PG — SIGNIFICANT CHANGE UP (ref 27–31)
MCHC RBC-ENTMCNC: 32.3 G/DL — SIGNIFICANT CHANGE UP (ref 32–37)
MCHC RBC-ENTMCNC: 32.4 G/DL — SIGNIFICANT CHANGE UP (ref 32–37)
MCHC RBC-ENTMCNC: 32.4 G/DL — SIGNIFICANT CHANGE UP (ref 32–37)
MCHC RBC-ENTMCNC: 32.5 G/DL — SIGNIFICANT CHANGE UP (ref 32–37)
MCHC RBC-ENTMCNC: 32.5 G/DL — SIGNIFICANT CHANGE UP (ref 32–37)
MCHC RBC-ENTMCNC: 32.7 G/DL — SIGNIFICANT CHANGE UP (ref 32–37)
MCHC RBC-ENTMCNC: 32.8 G/DL — SIGNIFICANT CHANGE UP (ref 32–37)
MCHC RBC-ENTMCNC: 32.8 G/DL — SIGNIFICANT CHANGE UP (ref 32–37)
MCV RBC AUTO: 84.8 FL — SIGNIFICANT CHANGE UP (ref 81–99)
MCV RBC AUTO: 85.6 FL — SIGNIFICANT CHANGE UP (ref 81–99)
MCV RBC AUTO: 85.8 FL — SIGNIFICANT CHANGE UP (ref 81–99)
MCV RBC AUTO: 86.4 FL — SIGNIFICANT CHANGE UP (ref 81–99)
MCV RBC AUTO: 87.6 FL — SIGNIFICANT CHANGE UP (ref 81–99)
MCV RBC AUTO: 87.7 FL — SIGNIFICANT CHANGE UP (ref 81–99)
MCV RBC AUTO: 88.1 FL — SIGNIFICANT CHANGE UP (ref 81–99)
MCV RBC AUTO: 88.3 FL — SIGNIFICANT CHANGE UP (ref 81–99)
MCV RBC AUTO: 88.4 FL — SIGNIFICANT CHANGE UP (ref 81–99)
MCV RBC AUTO: 89 FL — SIGNIFICANT CHANGE UP (ref 81–99)
MONOCYTES # BLD AUTO: 0.59 K/UL — SIGNIFICANT CHANGE UP (ref 0.1–0.6)
MONOCYTES # BLD AUTO: 0.61 K/UL — HIGH (ref 0.1–0.6)
MONOCYTES # BLD AUTO: 0.65 K/UL — HIGH (ref 0.1–0.6)
MONOCYTES # BLD AUTO: 0.68 K/UL — HIGH (ref 0.1–0.6)
MONOCYTES # BLD AUTO: 0.71 K/UL — HIGH (ref 0.1–0.6)
MONOCYTES # BLD AUTO: 0.75 K/UL — HIGH (ref 0.1–0.6)
MONOCYTES # BLD AUTO: 0.81 K/UL — HIGH (ref 0.1–0.6)
MONOCYTES NFR BLD AUTO: 10.2 % — HIGH (ref 1.7–9.3)
MONOCYTES NFR BLD AUTO: 12.2 % — HIGH (ref 1.7–9.3)
MONOCYTES NFR BLD AUTO: 12.3 % — HIGH (ref 1.7–9.3)
MONOCYTES NFR BLD AUTO: 16.7 % — HIGH (ref 1.7–9.3)
MONOCYTES NFR BLD AUTO: 6.7 % — SIGNIFICANT CHANGE UP (ref 1.7–9.3)
MONOCYTES NFR BLD AUTO: 7.1 % — SIGNIFICANT CHANGE UP (ref 1.7–9.3)
MONOCYTES NFR BLD AUTO: 8.5 % — SIGNIFICANT CHANGE UP (ref 1.7–9.3)
MONOCYTES NFR BLD AUTO: 9.8 % — HIGH (ref 1.7–9.3)
MONOCYTES NFR BLD AUTO: 9.9 % — HIGH (ref 1.7–9.3)
NEUTROPHILS # BLD AUTO: 2.39 K/UL — SIGNIFICANT CHANGE UP (ref 1.4–6.5)
NEUTROPHILS # BLD AUTO: 2.88 K/UL — SIGNIFICANT CHANGE UP (ref 1.4–6.5)
NEUTROPHILS # BLD AUTO: 3.18 K/UL — SIGNIFICANT CHANGE UP (ref 1.4–6.5)
NEUTROPHILS # BLD AUTO: 3.58 K/UL — SIGNIFICANT CHANGE UP (ref 1.4–6.5)
NEUTROPHILS # BLD AUTO: 4.24 K/UL — SIGNIFICANT CHANGE UP (ref 1.4–6.5)
NEUTROPHILS # BLD AUTO: 4.96 K/UL — SIGNIFICANT CHANGE UP (ref 1.4–6.5)
NEUTROPHILS # BLD AUTO: 5.27 K/UL — SIGNIFICANT CHANGE UP (ref 1.4–6.5)
NEUTROPHILS # BLD AUTO: 5.87 K/UL — SIGNIFICANT CHANGE UP (ref 1.4–6.5)
NEUTROPHILS # BLD AUTO: 7.37 K/UL — HIGH (ref 1.4–6.5)
NEUTROPHILS NFR BLD AUTO: 49.1 % — SIGNIFICANT CHANGE UP (ref 42.2–75.2)
NEUTROPHILS NFR BLD AUTO: 53.9 % — SIGNIFICANT CHANGE UP (ref 42.2–75.2)
NEUTROPHILS NFR BLD AUTO: 55.3 % — SIGNIFICANT CHANGE UP (ref 42.2–75.2)
NEUTROPHILS NFR BLD AUTO: 57.7 % — SIGNIFICANT CHANGE UP (ref 42.2–75.2)
NEUTROPHILS NFR BLD AUTO: 63.7 % — SIGNIFICANT CHANGE UP (ref 42.2–75.2)
NEUTROPHILS NFR BLD AUTO: 65.1 % — SIGNIFICANT CHANGE UP (ref 42.2–75.2)
NEUTROPHILS NFR BLD AUTO: 68.6 % — SIGNIFICANT CHANGE UP (ref 42.2–75.2)
NEUTROPHILS NFR BLD AUTO: 70.4 % — SIGNIFICANT CHANGE UP (ref 42.2–75.2)
NEUTROPHILS NFR BLD AUTO: 76.6 % — HIGH (ref 42.2–75.2)
NON HDL CHOLESTEROL: 45 MG/DL — SIGNIFICANT CHANGE UP
NRBC # BLD: 0 /100 WBCS — SIGNIFICANT CHANGE UP (ref 0–0)
NRBC # BLD: 1 /100 WBCS — HIGH (ref 0–0)
NT-PROBNP SERPL-SCNC: HIGH PG/ML (ref 0–300)
PCO2 BLDV: 45 MMHG — HIGH (ref 39–42)
PH BLDV: 7.4 — SIGNIFICANT CHANGE UP (ref 7.32–7.43)
PLATELET # BLD AUTO: 101 K/UL — LOW (ref 130–400)
PLATELET # BLD AUTO: 118 K/UL — LOW (ref 130–400)
PLATELET # BLD AUTO: 146 K/UL — SIGNIFICANT CHANGE UP (ref 130–400)
PLATELET # BLD AUTO: 146 K/UL — SIGNIFICANT CHANGE UP (ref 130–400)
PLATELET # BLD AUTO: 153 K/UL — SIGNIFICANT CHANGE UP (ref 130–400)
PLATELET # BLD AUTO: 159 K/UL — SIGNIFICANT CHANGE UP (ref 130–400)
PLATELET # BLD AUTO: 161 K/UL — SIGNIFICANT CHANGE UP (ref 130–400)
PLATELET # BLD AUTO: 169 K/UL — SIGNIFICANT CHANGE UP (ref 130–400)
PLATELET # BLD AUTO: 235 K/UL — SIGNIFICANT CHANGE UP (ref 130–400)
PLATELET # BLD AUTO: 54 K/UL — LOW (ref 130–400)
PMV BLD: SIGNIFICANT CHANGE UP (ref 7.4–10.4)
PO2 BLDV: 49 MMHG — SIGNIFICANT CHANGE UP
POTASSIUM BLDV-SCNC: 4 MMOL/L — SIGNIFICANT CHANGE UP (ref 3.5–5.1)
POTASSIUM SERPL-MCNC: 2.8 MMOL/L — LOW (ref 3.5–5)
POTASSIUM SERPL-MCNC: 3.3 MMOL/L — LOW (ref 3.5–5)
POTASSIUM SERPL-MCNC: 3.3 MMOL/L — LOW (ref 3.5–5)
POTASSIUM SERPL-MCNC: 3.4 MMOL/L — LOW (ref 3.5–5)
POTASSIUM SERPL-MCNC: 3.7 MMOL/L — SIGNIFICANT CHANGE UP (ref 3.5–5)
POTASSIUM SERPL-MCNC: 3.7 MMOL/L — SIGNIFICANT CHANGE UP (ref 3.5–5)
POTASSIUM SERPL-MCNC: 3.9 MMOL/L — SIGNIFICANT CHANGE UP (ref 3.5–5)
POTASSIUM SERPL-MCNC: 3.9 MMOL/L — SIGNIFICANT CHANGE UP (ref 3.5–5)
POTASSIUM SERPL-MCNC: 4.2 MMOL/L — SIGNIFICANT CHANGE UP (ref 3.5–5)
POTASSIUM SERPL-MCNC: 4.6 MMOL/L — SIGNIFICANT CHANGE UP (ref 3.5–5)
POTASSIUM SERPL-MCNC: 5.1 MMOL/L — HIGH (ref 3.5–5)
POTASSIUM SERPL-MCNC: SIGNIFICANT CHANGE UP MMOL/L (ref 3.5–5)
POTASSIUM SERPL-SCNC: 2.8 MMOL/L — LOW (ref 3.5–5)
POTASSIUM SERPL-SCNC: 3.3 MMOL/L — LOW (ref 3.5–5)
POTASSIUM SERPL-SCNC: 3.3 MMOL/L — LOW (ref 3.5–5)
POTASSIUM SERPL-SCNC: 3.4 MMOL/L — LOW (ref 3.5–5)
POTASSIUM SERPL-SCNC: 3.7 MMOL/L — SIGNIFICANT CHANGE UP (ref 3.5–5)
POTASSIUM SERPL-SCNC: 3.7 MMOL/L — SIGNIFICANT CHANGE UP (ref 3.5–5)
POTASSIUM SERPL-SCNC: 3.9 MMOL/L — SIGNIFICANT CHANGE UP (ref 3.5–5)
POTASSIUM SERPL-SCNC: 3.9 MMOL/L — SIGNIFICANT CHANGE UP (ref 3.5–5)
POTASSIUM SERPL-SCNC: 4.2 MMOL/L — SIGNIFICANT CHANGE UP (ref 3.5–5)
POTASSIUM SERPL-SCNC: 4.6 MMOL/L — SIGNIFICANT CHANGE UP (ref 3.5–5)
POTASSIUM SERPL-SCNC: 5.1 MMOL/L — HIGH (ref 3.5–5)
POTASSIUM SERPL-SCNC: SIGNIFICANT CHANGE UP MMOL/L (ref 3.5–5)
PROCALCITONIN SERPL-MCNC: 0.02 NG/ML — SIGNIFICANT CHANGE UP (ref 0.02–0.1)
PROT SERPL-MCNC: 6.9 G/DL — SIGNIFICANT CHANGE UP (ref 6–8)
PROT SERPL-MCNC: 6.9 G/DL — SIGNIFICANT CHANGE UP (ref 6–8)
PROT SERPL-MCNC: 7 G/DL — SIGNIFICANT CHANGE UP (ref 6–8)
PROT SERPL-MCNC: 7 G/DL — SIGNIFICANT CHANGE UP (ref 6–8)
PROT SERPL-MCNC: 7.4 G/DL — SIGNIFICANT CHANGE UP (ref 6–8)
PROT SERPL-MCNC: 7.5 G/DL — SIGNIFICANT CHANGE UP (ref 6–8)
PROT SERPL-MCNC: 7.5 G/DL — SIGNIFICANT CHANGE UP (ref 6–8)
PROT SERPL-MCNC: 7.6 G/DL — SIGNIFICANT CHANGE UP (ref 6–8)
PROT SERPL-MCNC: 7.9 G/DL — SIGNIFICANT CHANGE UP (ref 6–8)
RAPID RVP RESULT: SIGNIFICANT CHANGE UP
RBC # BLD: 5.02 M/UL — SIGNIFICANT CHANGE UP (ref 4.2–5.4)
RBC # BLD: 5.05 M/UL — SIGNIFICANT CHANGE UP (ref 4.2–5.4)
RBC # BLD: 5.08 M/UL — SIGNIFICANT CHANGE UP (ref 4.2–5.4)
RBC # BLD: 5.19 M/UL — SIGNIFICANT CHANGE UP (ref 4.2–5.4)
RBC # BLD: 5.25 M/UL — SIGNIFICANT CHANGE UP (ref 4.2–5.4)
RBC # BLD: 5.28 M/UL — SIGNIFICANT CHANGE UP (ref 4.2–5.4)
RBC # BLD: 5.32 M/UL — SIGNIFICANT CHANGE UP (ref 4.2–5.4)
RBC # BLD: 5.41 M/UL — HIGH (ref 4.2–5.4)
RBC # BLD: 5.56 M/UL — HIGH (ref 4.2–5.4)
RBC # BLD: 5.57 M/UL — HIGH (ref 4.2–5.4)
RBC # FLD: 19.3 % — HIGH (ref 11.5–14.5)
RBC # FLD: 19.5 % — HIGH (ref 11.5–14.5)
RBC # FLD: 19.7 % — HIGH (ref 11.5–14.5)
RBC # FLD: 19.7 % — HIGH (ref 11.5–14.5)
RBC # FLD: 19.8 % — HIGH (ref 11.5–14.5)
RBC # FLD: 19.9 % — HIGH (ref 11.5–14.5)
RBC # FLD: 20.1 % — HIGH (ref 11.5–14.5)
RBC # FLD: 20.1 % — HIGH (ref 11.5–14.5)
RBC # FLD: 20.2 % — HIGH (ref 11.5–14.5)
RBC # FLD: 20.3 % — HIGH (ref 11.5–14.5)
SAO2 % BLDV: 77 % — SIGNIFICANT CHANGE UP
SARS-COV-2 RNA SPEC QL NAA+PROBE: SIGNIFICANT CHANGE UP
SARS-COV-2 RNA SPEC QL NAA+PROBE: SIGNIFICANT CHANGE UP
SODIUM SERPL-SCNC: 129 MMOL/L — LOW (ref 135–146)
SODIUM SERPL-SCNC: 130 MMOL/L — LOW (ref 135–146)
SODIUM SERPL-SCNC: 132 MMOL/L — LOW (ref 135–146)
SODIUM SERPL-SCNC: 134 MMOL/L — LOW (ref 135–146)
SODIUM SERPL-SCNC: 135 MMOL/L — SIGNIFICANT CHANGE UP (ref 135–146)
SODIUM SERPL-SCNC: 135 MMOL/L — SIGNIFICANT CHANGE UP (ref 135–146)
SODIUM SERPL-SCNC: 136 MMOL/L — SIGNIFICANT CHANGE UP (ref 135–146)
SODIUM SERPL-SCNC: 137 MMOL/L — SIGNIFICANT CHANGE UP (ref 135–146)
SODIUM SERPL-SCNC: 137 MMOL/L — SIGNIFICANT CHANGE UP (ref 135–146)
SODIUM SERPL-SCNC: 138 MMOL/L — SIGNIFICANT CHANGE UP (ref 135–146)
SODIUM SERPL-SCNC: 139 MMOL/L — SIGNIFICANT CHANGE UP (ref 135–146)
SODIUM SERPL-SCNC: 139 MMOL/L — SIGNIFICANT CHANGE UP (ref 135–146)
TRIGL SERPL-MCNC: 63 MG/DL — SIGNIFICANT CHANGE UP
TROPONIN T SERPL-MCNC: <0.01 NG/ML — SIGNIFICANT CHANGE UP
WBC # BLD: 4.86 K/UL — SIGNIFICANT CHANGE UP (ref 4.8–10.8)
WBC # BLD: 5.26 K/UL — SIGNIFICANT CHANGE UP (ref 4.8–10.8)
WBC # BLD: 5.34 K/UL — SIGNIFICANT CHANGE UP (ref 4.8–10.8)
WBC # BLD: 5.76 K/UL — SIGNIFICANT CHANGE UP (ref 4.8–10.8)
WBC # BLD: 6.21 K/UL — SIGNIFICANT CHANGE UP (ref 4.8–10.8)
WBC # BLD: 6.67 K/UL — SIGNIFICANT CHANGE UP (ref 4.8–10.8)
WBC # BLD: 7.61 K/UL — SIGNIFICANT CHANGE UP (ref 4.8–10.8)
WBC # BLD: 7.67 K/UL — SIGNIFICANT CHANGE UP (ref 4.8–10.8)
WBC # BLD: 8.34 K/UL — SIGNIFICANT CHANGE UP (ref 4.8–10.8)
WBC # BLD: 9.64 K/UL — SIGNIFICANT CHANGE UP (ref 4.8–10.8)
WBC # FLD AUTO: 4.86 K/UL — SIGNIFICANT CHANGE UP (ref 4.8–10.8)
WBC # FLD AUTO: 5.26 K/UL — SIGNIFICANT CHANGE UP (ref 4.8–10.8)
WBC # FLD AUTO: 5.34 K/UL — SIGNIFICANT CHANGE UP (ref 4.8–10.8)
WBC # FLD AUTO: 5.76 K/UL — SIGNIFICANT CHANGE UP (ref 4.8–10.8)
WBC # FLD AUTO: 6.21 K/UL — SIGNIFICANT CHANGE UP (ref 4.8–10.8)
WBC # FLD AUTO: 6.67 K/UL — SIGNIFICANT CHANGE UP (ref 4.8–10.8)
WBC # FLD AUTO: 7.61 K/UL — SIGNIFICANT CHANGE UP (ref 4.8–10.8)
WBC # FLD AUTO: 7.67 K/UL — SIGNIFICANT CHANGE UP (ref 4.8–10.8)
WBC # FLD AUTO: 8.34 K/UL — SIGNIFICANT CHANGE UP (ref 4.8–10.8)
WBC # FLD AUTO: 9.64 K/UL — SIGNIFICANT CHANGE UP (ref 4.8–10.8)

## 2023-01-01 PROCEDURE — 85027 COMPLETE CBC AUTOMATED: CPT

## 2023-01-01 PROCEDURE — 99232 SBSQ HOSP IP/OBS MODERATE 35: CPT

## 2023-01-01 PROCEDURE — 92610 EVALUATE SWALLOWING FUNCTION: CPT | Mod: GN

## 2023-01-01 PROCEDURE — 87635 SARS-COV-2 COVID-19 AMP PRB: CPT

## 2023-01-01 PROCEDURE — C1889: CPT

## 2023-01-01 PROCEDURE — 93017 CV STRESS TEST TRACING ONLY: CPT

## 2023-01-01 PROCEDURE — 0225U NFCT DS DNA&RNA 21 SARSCOV2: CPT

## 2023-01-01 PROCEDURE — 71250 CT THORAX DX C-: CPT | Mod: 26

## 2023-01-01 PROCEDURE — 99285 EMERGENCY DEPT VISIT HI MDM: CPT | Mod: FS

## 2023-01-01 PROCEDURE — 83605 ASSAY OF LACTIC ACID: CPT

## 2023-01-01 PROCEDURE — 99233 SBSQ HOSP IP/OBS HIGH 50: CPT

## 2023-01-01 PROCEDURE — 99223 1ST HOSP IP/OBS HIGH 75: CPT | Mod: 57

## 2023-01-01 PROCEDURE — 93016 CV STRESS TEST SUPVJ ONLY: CPT

## 2023-01-01 PROCEDURE — 71045 X-RAY EXAM CHEST 1 VIEW: CPT | Mod: 26

## 2023-01-01 PROCEDURE — 33228 REMV&REPLC PM GEN DUAL LEAD: CPT

## 2023-01-01 PROCEDURE — 86803 HEPATITIS C AB TEST: CPT

## 2023-01-01 PROCEDURE — 93306 TTE W/DOPPLER COMPLETE: CPT | Mod: 26

## 2023-01-01 PROCEDURE — 83735 ASSAY OF MAGNESIUM: CPT

## 2023-01-01 PROCEDURE — 80053 COMPREHEN METABOLIC PANEL: CPT

## 2023-01-01 PROCEDURE — 86901 BLOOD TYPING SEROLOGIC RH(D): CPT

## 2023-01-01 PROCEDURE — 80048 BASIC METABOLIC PNL TOTAL CA: CPT

## 2023-01-01 PROCEDURE — 85025 COMPLETE CBC W/AUTO DIFF WBC: CPT

## 2023-01-01 PROCEDURE — 71045 X-RAY EXAM CHEST 1 VIEW: CPT

## 2023-01-01 PROCEDURE — A9500: CPT

## 2023-01-01 PROCEDURE — 78452 HT MUSCLE IMAGE SPECT MULT: CPT | Mod: 26

## 2023-01-01 PROCEDURE — 71250 CT THORAX DX C-: CPT

## 2023-01-01 PROCEDURE — 99223 1ST HOSP IP/OBS HIGH 75: CPT

## 2023-01-01 PROCEDURE — 86850 RBC ANTIBODY SCREEN: CPT

## 2023-01-01 PROCEDURE — 92526 ORAL FUNCTION THERAPY: CPT | Mod: GN

## 2023-01-01 PROCEDURE — 93010 ELECTROCARDIOGRAM REPORT: CPT

## 2023-01-01 PROCEDURE — 93280 PM DEVICE PROGR EVAL DUAL: CPT

## 2023-01-01 PROCEDURE — 86900 BLOOD TYPING SEROLOGIC ABO: CPT

## 2023-01-01 PROCEDURE — 93018 CV STRESS TEST I&R ONLY: CPT

## 2023-01-01 PROCEDURE — 93306 TTE W/DOPPLER COMPLETE: CPT

## 2023-01-01 PROCEDURE — 84132 ASSAY OF SERUM POTASSIUM: CPT

## 2023-01-01 PROCEDURE — 99284 EMERGENCY DEPT VISIT MOD MDM: CPT | Mod: FS

## 2023-01-01 PROCEDURE — 84295 ASSAY OF SERUM SODIUM: CPT

## 2023-01-01 PROCEDURE — 99283 EMERGENCY DEPT VISIT LOW MDM: CPT

## 2023-01-01 PROCEDURE — 82803 BLOOD GASES ANY COMBINATION: CPT

## 2023-01-01 PROCEDURE — 80061 LIPID PANEL: CPT

## 2023-01-01 PROCEDURE — 84145 PROCALCITONIN (PCT): CPT

## 2023-01-01 PROCEDURE — 97163 PT EVAL HIGH COMPLEX 45 MIN: CPT | Mod: GP

## 2023-01-01 PROCEDURE — 82330 ASSAY OF CALCIUM: CPT

## 2023-01-01 PROCEDURE — C1785: CPT

## 2023-01-01 PROCEDURE — 85018 HEMOGLOBIN: CPT

## 2023-01-01 PROCEDURE — 78452 HT MUSCLE IMAGE SPECT MULT: CPT

## 2023-01-01 PROCEDURE — 93280 PM DEVICE PROGR EVAL DUAL: CPT | Mod: 26

## 2023-01-01 PROCEDURE — 99233 SBSQ HOSP IP/OBS HIGH 50: CPT | Mod: 57

## 2023-01-01 PROCEDURE — 85014 HEMATOCRIT: CPT

## 2023-01-01 PROCEDURE — 97110 THERAPEUTIC EXERCISES: CPT | Mod: GP

## 2023-01-01 PROCEDURE — 36415 COLL VENOUS BLD VENIPUNCTURE: CPT

## 2023-01-01 PROCEDURE — 97530 THERAPEUTIC ACTIVITIES: CPT | Mod: GP

## 2023-01-01 PROCEDURE — 84484 ASSAY OF TROPONIN QUANT: CPT

## 2023-01-01 RX ORDER — CEPHALEXIN 500 MG
500 CAPSULE ORAL EVERY 12 HOURS
Refills: 0 | Status: COMPLETED | OUTPATIENT
Start: 2023-01-01 | End: 2023-01-01

## 2023-01-01 RX ORDER — FLUPHENAZINE HYDROCHLORIDE 1 MG/1
25 TABLET, FILM COATED ORAL
Refills: 0 | DISCHARGE

## 2023-01-01 RX ORDER — FUROSEMIDE 40 MG
40 TABLET ORAL
Refills: 0 | Status: DISCONTINUED | OUTPATIENT
Start: 2023-01-01 | End: 2023-01-01

## 2023-01-01 RX ORDER — POTASSIUM CHLORIDE 20 MEQ
20 PACKET (EA) ORAL ONCE
Refills: 0 | Status: COMPLETED | OUTPATIENT
Start: 2023-01-01 | End: 2023-01-01

## 2023-01-01 RX ORDER — OXYBUTYNIN CHLORIDE 5 MG
5 TABLET ORAL DAILY
Refills: 0 | Status: DISCONTINUED | OUTPATIENT
Start: 2023-01-01 | End: 2023-01-01

## 2023-01-01 RX ORDER — ENOXAPARIN SODIUM 100 MG/ML
40 INJECTION SUBCUTANEOUS EVERY 24 HOURS
Refills: 0 | Status: DISCONTINUED | OUTPATIENT
Start: 2023-01-01 | End: 2023-01-01

## 2023-01-01 RX ORDER — LISINOPRIL 2.5 MG/1
5 TABLET ORAL ONCE
Refills: 0 | Status: COMPLETED | OUTPATIENT
Start: 2023-01-01 | End: 2023-01-01

## 2023-01-01 RX ORDER — BENZTROPINE MESYLATE 1 MG
0.5 TABLET ORAL DAILY
Refills: 0 | Status: DISCONTINUED | OUTPATIENT
Start: 2023-01-01 | End: 2023-01-01

## 2023-01-01 RX ORDER — ONDANSETRON 8 MG/1
4 TABLET, FILM COATED ORAL EVERY 8 HOURS
Refills: 0 | Status: DISCONTINUED | OUTPATIENT
Start: 2023-01-01 | End: 2023-01-01

## 2023-01-01 RX ORDER — OXYBUTYNIN CHLORIDE 5 MG
1 TABLET ORAL
Qty: 0 | Refills: 0 | DISCHARGE

## 2023-01-01 RX ORDER — REGADENOSON 0.08 MG/ML
0.4 INJECTION, SOLUTION INTRAVENOUS ONCE
Refills: 0 | Status: DISCONTINUED | OUTPATIENT
Start: 2023-01-01 | End: 2023-01-01

## 2023-01-01 RX ORDER — FLUPHENAZINE HYDROCHLORIDE 1 MG/1
1 TABLET, FILM COATED ORAL
Qty: 0 | Refills: 0 | DISCHARGE

## 2023-01-01 RX ORDER — BENZTROPINE MESYLATE 1 MG
1 TABLET ORAL
Refills: 0 | DISCHARGE

## 2023-01-01 RX ORDER — OXYBUTYNIN CHLORIDE 5 MG
1 TABLET ORAL
Refills: 0 | DISCHARGE

## 2023-01-01 RX ORDER — ACETAMINOPHEN 500 MG
650 TABLET ORAL EVERY 6 HOURS
Refills: 0 | Status: DISCONTINUED | OUTPATIENT
Start: 2023-01-01 | End: 2023-01-01

## 2023-01-01 RX ORDER — FOLIC ACID 0.8 MG
1 TABLET ORAL DAILY
Refills: 0 | Status: DISCONTINUED | OUTPATIENT
Start: 2023-01-01 | End: 2023-01-01

## 2023-01-01 RX ORDER — FUROSEMIDE 40 MG
20 TABLET ORAL ONCE
Refills: 0 | Status: COMPLETED | OUTPATIENT
Start: 2023-01-01 | End: 2023-01-01

## 2023-01-01 RX ORDER — ATORVASTATIN CALCIUM 80 MG/1
10 TABLET, FILM COATED ORAL AT BEDTIME
Refills: 0 | Status: DISCONTINUED | OUTPATIENT
Start: 2023-01-01 | End: 2023-01-01

## 2023-01-01 RX ORDER — METOPROLOL TARTRATE 50 MG
1 TABLET ORAL
Qty: 0 | Refills: 0 | DISCHARGE

## 2023-01-01 RX ORDER — PANTOPRAZOLE SODIUM 20 MG/1
40 TABLET, DELAYED RELEASE ORAL
Refills: 0 | Status: DISCONTINUED | OUTPATIENT
Start: 2023-01-01 | End: 2023-01-01

## 2023-01-01 RX ORDER — FERROUS SULFATE 325(65) MG
1 TABLET ORAL
Refills: 0 | DISCHARGE

## 2023-01-01 RX ORDER — FOLIC ACID 0.8 MG
1 TABLET ORAL
Qty: 0 | Refills: 0 | DISCHARGE

## 2023-01-01 RX ORDER — MAGNESIUM SULFATE 500 MG/ML
2 VIAL (ML) INJECTION ONCE
Refills: 0 | Status: COMPLETED | OUTPATIENT
Start: 2023-01-01 | End: 2023-01-01

## 2023-01-01 RX ORDER — METOPROLOL TARTRATE 50 MG
25 TABLET ORAL
Refills: 0 | DISCHARGE

## 2023-01-01 RX ORDER — ATORVASTATIN CALCIUM 80 MG/1
1 TABLET, FILM COATED ORAL
Qty: 0 | Refills: 0 | DISCHARGE

## 2023-01-01 RX ORDER — POTASSIUM CHLORIDE 20 MEQ
20 PACKET (EA) ORAL
Refills: 0 | Status: COMPLETED | OUTPATIENT
Start: 2023-01-01 | End: 2023-01-01

## 2023-01-01 RX ORDER — METOPROLOL TARTRATE 50 MG
1 TABLET ORAL
Qty: 0 | Refills: 0 | DISCHARGE
Start: 2023-01-01

## 2023-01-01 RX ORDER — LISINOPRIL 2.5 MG/1
5 TABLET ORAL DAILY
Refills: 0 | Status: DISCONTINUED | OUTPATIENT
Start: 2023-01-01 | End: 2023-01-01

## 2023-01-01 RX ORDER — ASPIRIN/CALCIUM CARB/MAGNESIUM 324 MG
1 TABLET ORAL
Qty: 0 | Refills: 0 | DISCHARGE

## 2023-01-01 RX ORDER — AMANTADINE HCL 100 MG
1 CAPSULE ORAL
Refills: 0 | DISCHARGE

## 2023-01-01 RX ORDER — LANOLIN ALCOHOL/MO/W.PET/CERES
3 CREAM (GRAM) TOPICAL AT BEDTIME
Refills: 0 | Status: DISCONTINUED | OUTPATIENT
Start: 2023-01-01 | End: 2023-01-01

## 2023-01-01 RX ORDER — POTASSIUM CHLORIDE 20 MEQ
40 PACKET (EA) ORAL ONCE
Refills: 0 | Status: COMPLETED | OUTPATIENT
Start: 2023-01-01 | End: 2023-01-01

## 2023-01-01 RX ORDER — GUAIFENESIN/DEXTROMETHORPHAN 600MG-30MG
5 TABLET, EXTENDED RELEASE 12 HR ORAL ONCE
Refills: 0 | Status: COMPLETED | OUTPATIENT
Start: 2023-01-01 | End: 2023-01-01

## 2023-01-01 RX ORDER — HEPARIN SODIUM 5000 [USP'U]/ML
5000 INJECTION INTRAVENOUS; SUBCUTANEOUS EVERY 12 HOURS
Refills: 0 | Status: DISCONTINUED | OUTPATIENT
Start: 2023-01-01 | End: 2023-01-01

## 2023-01-01 RX ORDER — AMANTADINE HCL 100 MG
100 CAPSULE ORAL DAILY
Refills: 0 | Status: DISCONTINUED | OUTPATIENT
Start: 2023-01-01 | End: 2023-01-01

## 2023-01-01 RX ORDER — GLIMEPIRIDE 1 MG
1 TABLET ORAL
Refills: 0 | DISCHARGE

## 2023-01-01 RX ORDER — ASPIRIN/CALCIUM CARB/MAGNESIUM 324 MG
81 TABLET ORAL DAILY
Refills: 0 | Status: DISCONTINUED | OUTPATIENT
Start: 2023-01-01 | End: 2023-01-01

## 2023-01-01 RX ORDER — LISINOPRIL 2.5 MG/1
1 TABLET ORAL
Qty: 0 | Refills: 0 | DISCHARGE

## 2023-01-01 RX ORDER — FUROSEMIDE 40 MG
40 TABLET ORAL DAILY
Refills: 0 | Status: DISCONTINUED | OUTPATIENT
Start: 2023-01-01 | End: 2023-01-01

## 2023-01-01 RX ORDER — LANOLIN ALCOHOL/MO/W.PET/CERES
1 CREAM (GRAM) TOPICAL
Refills: 0 | DISCHARGE

## 2023-01-01 RX ORDER — POLYMYXIN B SULF/TRIMETHOPRIM 10000-1/ML
1 DROPS OPHTHALMIC (EYE)
Refills: 0 | DISCHARGE

## 2023-01-01 RX ORDER — BENZTROPINE MESYLATE 1 MG
1 TABLET ORAL
Qty: 0 | Refills: 0 | DISCHARGE

## 2023-01-01 RX ORDER — GABAPENTIN 400 MG/1
1 CAPSULE ORAL
Qty: 0 | Refills: 0 | DISCHARGE

## 2023-01-01 RX ORDER — METOPROLOL TARTRATE 50 MG
25 TABLET ORAL DAILY
Refills: 0 | Status: DISCONTINUED | OUTPATIENT
Start: 2023-01-01 | End: 2023-01-01

## 2023-01-01 RX ORDER — CEPHALEXIN 500 MG
1 CAPSULE ORAL
Qty: 5 | Refills: 0
Start: 2023-01-01 | End: 2023-01-01

## 2023-01-01 RX ORDER — VANCOMYCIN HCL 1 G
1000 VIAL (EA) INTRAVENOUS ONCE
Refills: 0 | Status: COMPLETED | OUTPATIENT
Start: 2023-01-01 | End: 2023-01-01

## 2023-01-01 RX ORDER — FERROUS FUMARATE 350(115)MG
1 TABLET ORAL
Refills: 0 | DISCHARGE

## 2023-01-01 RX ORDER — FLUOXETINE HCL 10 MG
1 CAPSULE ORAL
Qty: 0 | Refills: 0 | DISCHARGE

## 2023-01-01 RX ORDER — FLUOXETINE HCL 10 MG
10 CAPSULE ORAL DAILY
Refills: 0 | Status: DISCONTINUED | OUTPATIENT
Start: 2023-01-01 | End: 2023-01-01

## 2023-01-01 RX ORDER — PANTOPRAZOLE SODIUM 20 MG/1
1 TABLET, DELAYED RELEASE ORAL
Refills: 0 | DISCHARGE

## 2023-01-01 RX ORDER — METOPROLOL TARTRATE 50 MG
25 TABLET ORAL ONCE
Refills: 0 | Status: COMPLETED | OUTPATIENT
Start: 2023-01-01 | End: 2023-01-01

## 2023-01-01 RX ORDER — ROBINUL 0.2 MG/ML
1 INJECTION INTRAMUSCULAR; INTRAVENOUS
Refills: 0 | DISCHARGE

## 2023-01-01 RX ADMIN — ATORVASTATIN CALCIUM 10 MILLIGRAM(S): 80 TABLET, FILM COATED ORAL at 21:18

## 2023-01-01 RX ADMIN — Medication 5 MILLIGRAM(S): at 11:57

## 2023-01-01 RX ADMIN — ATORVASTATIN CALCIUM 10 MILLIGRAM(S): 80 TABLET, FILM COATED ORAL at 22:00

## 2023-01-01 RX ADMIN — Medication 100 MILLIGRAM(S): at 11:57

## 2023-01-01 RX ADMIN — Medication 500 MILLIGRAM(S): at 17:03

## 2023-01-01 RX ADMIN — Medication 5 MILLIGRAM(S): at 11:58

## 2023-01-01 RX ADMIN — LISINOPRIL 5 MILLIGRAM(S): 2.5 TABLET ORAL at 05:45

## 2023-01-01 RX ADMIN — Medication 1 MILLIGRAM(S): at 11:21

## 2023-01-01 RX ADMIN — ATORVASTATIN CALCIUM 10 MILLIGRAM(S): 80 TABLET, FILM COATED ORAL at 21:35

## 2023-01-01 RX ADMIN — Medication 40 MILLIGRAM(S): at 17:03

## 2023-01-01 RX ADMIN — Medication 0.5 MILLIGRAM(S): at 13:56

## 2023-01-01 RX ADMIN — LISINOPRIL 5 MILLIGRAM(S): 2.5 TABLET ORAL at 05:52

## 2023-01-01 RX ADMIN — PANTOPRAZOLE SODIUM 40 MILLIGRAM(S): 20 TABLET, DELAYED RELEASE ORAL at 21:56

## 2023-01-01 RX ADMIN — Medication 25 GRAM(S): at 12:29

## 2023-01-01 RX ADMIN — Medication 10 MILLIGRAM(S): at 11:22

## 2023-01-01 RX ADMIN — Medication 500 MILLIGRAM(S): at 05:47

## 2023-01-01 RX ADMIN — PANTOPRAZOLE SODIUM 40 MILLIGRAM(S): 20 TABLET, DELAYED RELEASE ORAL at 17:11

## 2023-01-01 RX ADMIN — Medication 40 MILLIGRAM(S): at 05:44

## 2023-01-01 RX ADMIN — LISINOPRIL 5 MILLIGRAM(S): 2.5 TABLET ORAL at 06:26

## 2023-01-01 RX ADMIN — Medication 25 MILLIGRAM(S): at 06:23

## 2023-01-01 RX ADMIN — PANTOPRAZOLE SODIUM 40 MILLIGRAM(S): 20 TABLET, DELAYED RELEASE ORAL at 05:54

## 2023-01-01 RX ADMIN — Medication 0.5 MILLIGRAM(S): at 11:42

## 2023-01-01 RX ADMIN — Medication 100 MILLIGRAM(S): at 12:25

## 2023-01-01 RX ADMIN — Medication 100 MILLIGRAM(S): at 11:22

## 2023-01-01 RX ADMIN — Medication 250 MILLIGRAM(S): at 12:49

## 2023-01-01 RX ADMIN — Medication 10 MILLIGRAM(S): at 14:06

## 2023-01-01 RX ADMIN — Medication 0.5 MILLIGRAM(S): at 14:23

## 2023-01-01 RX ADMIN — ENOXAPARIN SODIUM 40 MILLIGRAM(S): 100 INJECTION SUBCUTANEOUS at 12:26

## 2023-01-01 RX ADMIN — Medication 10 MILLIGRAM(S): at 11:59

## 2023-01-01 RX ADMIN — Medication 25 MILLIGRAM(S): at 05:51

## 2023-01-01 RX ADMIN — Medication 40 MILLIGRAM(S): at 06:29

## 2023-01-01 RX ADMIN — PANTOPRAZOLE SODIUM 40 MILLIGRAM(S): 20 TABLET, DELAYED RELEASE ORAL at 06:36

## 2023-01-01 RX ADMIN — Medication 1 MILLIGRAM(S): at 14:24

## 2023-01-01 RX ADMIN — Medication 50 MILLIEQUIVALENT(S): at 11:41

## 2023-01-01 RX ADMIN — Medication 5 MILLILITER(S): at 03:57

## 2023-01-01 RX ADMIN — Medication 5 MILLIGRAM(S): at 11:42

## 2023-01-01 RX ADMIN — PANTOPRAZOLE SODIUM 40 MILLIGRAM(S): 20 TABLET, DELAYED RELEASE ORAL at 06:23

## 2023-01-01 RX ADMIN — Medication 81 MILLIGRAM(S): at 12:28

## 2023-01-01 RX ADMIN — Medication 40 MILLIGRAM(S): at 05:26

## 2023-01-01 RX ADMIN — Medication 100 MILLIGRAM(S): at 13:56

## 2023-01-01 RX ADMIN — LISINOPRIL 5 MILLIGRAM(S): 2.5 TABLET ORAL at 14:39

## 2023-01-01 RX ADMIN — Medication 81 MILLIGRAM(S): at 14:23

## 2023-01-01 RX ADMIN — LISINOPRIL 5 MILLIGRAM(S): 2.5 TABLET ORAL at 05:54

## 2023-01-01 RX ADMIN — PANTOPRAZOLE SODIUM 40 MILLIGRAM(S): 20 TABLET, DELAYED RELEASE ORAL at 05:45

## 2023-01-01 RX ADMIN — Medication 5 MILLIGRAM(S): at 12:25

## 2023-01-01 RX ADMIN — Medication 500 MILLIGRAM(S): at 05:51

## 2023-01-01 RX ADMIN — Medication 40 MILLIGRAM(S): at 14:06

## 2023-01-01 RX ADMIN — PANTOPRAZOLE SODIUM 40 MILLIGRAM(S): 20 TABLET, DELAYED RELEASE ORAL at 05:57

## 2023-01-01 RX ADMIN — Medication 1 MILLIGRAM(S): at 12:28

## 2023-01-01 RX ADMIN — Medication 50 MILLIEQUIVALENT(S): at 13:59

## 2023-01-01 RX ADMIN — LISINOPRIL 5 MILLIGRAM(S): 2.5 TABLET ORAL at 06:33

## 2023-01-01 RX ADMIN — Medication 40 MILLIGRAM(S): at 05:56

## 2023-01-01 RX ADMIN — Medication 25 MILLIGRAM(S): at 14:39

## 2023-01-01 RX ADMIN — Medication 1 MILLIGRAM(S): at 13:54

## 2023-01-01 RX ADMIN — Medication 10 MILLIGRAM(S): at 11:06

## 2023-01-01 RX ADMIN — PANTOPRAZOLE SODIUM 40 MILLIGRAM(S): 20 TABLET, DELAYED RELEASE ORAL at 17:01

## 2023-01-01 RX ADMIN — ATORVASTATIN CALCIUM 10 MILLIGRAM(S): 80 TABLET, FILM COATED ORAL at 21:29

## 2023-01-01 RX ADMIN — Medication 500 MILLIGRAM(S): at 17:02

## 2023-01-01 RX ADMIN — Medication 40 MILLIGRAM(S): at 06:22

## 2023-01-01 RX ADMIN — Medication 50 MILLIEQUIVALENT(S): at 17:01

## 2023-01-01 RX ADMIN — Medication 1 MILLIGRAM(S): at 11:57

## 2023-01-01 RX ADMIN — Medication 500 MILLIGRAM(S): at 05:26

## 2023-01-01 RX ADMIN — PANTOPRAZOLE SODIUM 40 MILLIGRAM(S): 20 TABLET, DELAYED RELEASE ORAL at 17:03

## 2023-01-01 RX ADMIN — Medication 25 GRAM(S): at 11:08

## 2023-01-01 RX ADMIN — Medication 500 MILLIGRAM(S): at 05:54

## 2023-01-01 RX ADMIN — Medication 25 MILLIGRAM(S): at 06:36

## 2023-01-01 RX ADMIN — Medication 0.5 MILLIGRAM(S): at 11:22

## 2023-01-01 RX ADMIN — Medication 5 MILLIGRAM(S): at 11:12

## 2023-01-01 RX ADMIN — Medication 0.5 MILLIGRAM(S): at 12:25

## 2023-01-01 RX ADMIN — Medication 25 MILLIGRAM(S): at 05:26

## 2023-01-01 RX ADMIN — Medication 81 MILLIGRAM(S): at 11:43

## 2023-01-01 RX ADMIN — Medication 100 MILLIGRAM(S): at 11:05

## 2023-01-01 RX ADMIN — Medication 0.5 MILLIGRAM(S): at 11:57

## 2023-01-01 RX ADMIN — Medication 25 MILLIGRAM(S): at 05:44

## 2023-01-01 RX ADMIN — ATORVASTATIN CALCIUM 10 MILLIGRAM(S): 80 TABLET, FILM COATED ORAL at 21:20

## 2023-01-01 RX ADMIN — Medication 10 MILLIGRAM(S): at 11:57

## 2023-01-01 RX ADMIN — Medication 40 MILLIGRAM(S): at 13:53

## 2023-01-01 RX ADMIN — Medication 50 MILLIEQUIVALENT(S): at 01:42

## 2023-01-01 RX ADMIN — Medication 5 MILLIGRAM(S): at 13:55

## 2023-01-01 RX ADMIN — Medication 10 MILLIGRAM(S): at 11:42

## 2023-01-01 RX ADMIN — Medication 81 MILLIGRAM(S): at 11:21

## 2023-01-01 RX ADMIN — Medication 25 MILLIGRAM(S): at 05:48

## 2023-01-01 RX ADMIN — Medication 25 MILLIGRAM(S): at 05:54

## 2023-01-01 RX ADMIN — Medication 100 MILLIGRAM(S): at 11:42

## 2023-01-01 RX ADMIN — Medication 100 MILLIGRAM(S): at 11:58

## 2023-01-01 RX ADMIN — Medication 81 MILLIGRAM(S): at 11:06

## 2023-01-01 RX ADMIN — Medication 20 MILLIGRAM(S): at 05:54

## 2023-01-01 RX ADMIN — Medication 10 MILLIGRAM(S): at 13:55

## 2023-01-01 RX ADMIN — HEPARIN SODIUM 5000 UNIT(S): 5000 INJECTION INTRAVENOUS; SUBCUTANEOUS at 21:20

## 2023-01-01 RX ADMIN — Medication 50 MILLIEQUIVALENT(S): at 11:05

## 2023-01-01 RX ADMIN — Medication 1 MILLIGRAM(S): at 11:43

## 2023-01-01 RX ADMIN — Medication 40 MILLIGRAM(S): at 05:51

## 2023-01-01 RX ADMIN — Medication 81 MILLIGRAM(S): at 11:58

## 2023-01-01 RX ADMIN — Medication 40 MILLIEQUIVALENT(S): at 06:36

## 2023-01-01 RX ADMIN — Medication 5 MILLILITER(S): at 05:47

## 2023-01-01 RX ADMIN — Medication 20 MILLIGRAM(S): at 00:30

## 2023-01-01 RX ADMIN — ENOXAPARIN SODIUM 40 MILLIGRAM(S): 100 INJECTION SUBCUTANEOUS at 12:00

## 2023-01-01 RX ADMIN — Medication 20 MILLIGRAM(S): at 05:45

## 2023-01-01 RX ADMIN — Medication 5 MILLIGRAM(S): at 11:22

## 2023-01-01 RX ADMIN — Medication 25 GRAM(S): at 12:00

## 2023-01-01 RX ADMIN — PANTOPRAZOLE SODIUM 40 MILLIGRAM(S): 20 TABLET, DELAYED RELEASE ORAL at 05:48

## 2023-01-01 RX ADMIN — Medication 5 MILLILITER(S): at 05:52

## 2023-01-01 RX ADMIN — Medication 0.5 MILLIGRAM(S): at 11:07

## 2023-01-01 RX ADMIN — Medication 20 MILLIGRAM(S): at 17:42

## 2023-01-01 RX ADMIN — Medication 0.5 MILLIGRAM(S): at 11:58

## 2023-01-01 RX ADMIN — ATORVASTATIN CALCIUM 10 MILLIGRAM(S): 80 TABLET, FILM COATED ORAL at 22:25

## 2023-01-01 RX ADMIN — Medication 81 MILLIGRAM(S): at 13:54

## 2023-01-01 RX ADMIN — Medication 20 MILLIGRAM(S): at 05:48

## 2023-01-01 RX ADMIN — Medication 1 MILLIGRAM(S): at 11:06

## 2023-01-01 RX ADMIN — PANTOPRAZOLE SODIUM 40 MILLIGRAM(S): 20 TABLET, DELAYED RELEASE ORAL at 05:51

## 2023-01-01 RX ADMIN — LISINOPRIL 5 MILLIGRAM(S): 2.5 TABLET ORAL at 05:44

## 2023-01-01 RX ADMIN — Medication 500 MILLIGRAM(S): at 21:19

## 2023-01-01 RX ADMIN — PANTOPRAZOLE SODIUM 40 MILLIGRAM(S): 20 TABLET, DELAYED RELEASE ORAL at 05:26

## 2023-01-01 RX ADMIN — Medication 500 MILLIGRAM(S): at 16:02

## 2023-01-01 RX ADMIN — Medication 5 MILLIGRAM(S): at 14:06

## 2023-01-01 RX ADMIN — Medication 100 MILLIGRAM(S): at 14:06

## 2023-01-01 RX ADMIN — PANTOPRAZOLE SODIUM 40 MILLIGRAM(S): 20 TABLET, DELAYED RELEASE ORAL at 17:15

## 2023-01-01 RX ADMIN — Medication 25 GRAM(S): at 11:05

## 2023-01-01 RX ADMIN — PANTOPRAZOLE SODIUM 40 MILLIGRAM(S): 20 TABLET, DELAYED RELEASE ORAL at 17:02

## 2023-01-01 RX ADMIN — ATORVASTATIN CALCIUM 10 MILLIGRAM(S): 80 TABLET, FILM COATED ORAL at 23:07

## 2023-01-01 RX ADMIN — Medication 50 MILLIEQUIVALENT(S): at 17:18

## 2023-01-01 RX ADMIN — LISINOPRIL 5 MILLIGRAM(S): 2.5 TABLET ORAL at 05:57

## 2023-01-01 RX ADMIN — HEPARIN SODIUM 5000 UNIT(S): 5000 INJECTION INTRAVENOUS; SUBCUTANEOUS at 05:45

## 2023-01-01 RX ADMIN — Medication 50 MILLIEQUIVALENT(S): at 11:08

## 2023-01-01 RX ADMIN — Medication 10 MILLIGRAM(S): at 12:24

## 2023-01-01 RX ADMIN — Medication 25 MILLIGRAM(S): at 05:56

## 2023-01-01 RX ADMIN — PANTOPRAZOLE SODIUM 40 MILLIGRAM(S): 20 TABLET, DELAYED RELEASE ORAL at 17:18

## 2023-01-01 RX ADMIN — Medication 81 MILLIGRAM(S): at 11:57

## 2023-01-01 RX ADMIN — Medication 500 MILLIGRAM(S): at 17:01

## 2023-01-01 RX ADMIN — Medication 500 MILLIGRAM(S): at 17:19

## 2023-01-01 RX ADMIN — LISINOPRIL 5 MILLIGRAM(S): 2.5 TABLET ORAL at 06:36

## 2023-09-07 NOTE — ED PROVIDER NOTE - OBJECTIVE STATEMENT
72-year-old female history of HLD, CAD, anemia, schizophrenia, PPM, now presents with elevated blood pressure reading this morning, denies exertional chest pain, fever, nausea, vomiting, diaphoresis, hemoptysis, SOB, orthopnea, PND, LE pain or swelling, recent immobilization or travel or other associated complaints at present. Old chart reviewed. I have reviewed and agree with the initial nursing note, except as documented in my note.

## 2023-09-07 NOTE — ED PROVIDER NOTE - CARE PROVIDER_API CALL
Venkata Hebert  Internal Medicine  [address] [place] [address]  Phone: [phone]  Fax: [phone]  Follow Up Time:

## 2023-09-07 NOTE — ED ADULT NURSE NOTE - NSFALLHARMRISKINTERV_ED_ALL_ED
Assistance OOB with selected safe patient handling equipment if applicable/Assistance with ambulation/Communicate risk of Fall with Harm to all staff, patient, and family/Monitor gait and stability/Provide visual cue: red socks, yellow wristband, yellow gown, etc/Reinforce activity limits and safety measures with patient and family/Use of alarms - bed, stretcher, chair and/or video monitoring/Bed in lowest position, wheels locked, appropriate side rails in place/Call bell, personal items and telephone in reach/Instruct patient to call for assistance before getting out of bed/chair/stretcher/Non-slip footwear applied when patient is off stretcher/[place] to call system/Physically safe environment - no spills, clutter or unnecessary equipment/Purposeful Proactive Rounding/Room/bathroom lighting operational, light cord in reach

## 2023-09-07 NOTE — ED PROVIDER NOTE - NSFOLLOWUPINSTRUCTIONS_ED_ALL_ED_FT
Patient's BP in ED improved to systolic 140, patient given her daily 12pm BP meds of Lisinopril and Lopressor. Pt has been without complaints in ED          Hypertension, Adult  High blood pressure (hypertension) is when the force of blood pumping through the arteries is too strong. The arteries are the blood vessels that carry blood from the heart throughout the body. Hypertension forces the heart to work harder to pump blood and may cause arteries to become narrow or stiff. Untreated or uncontrolled hypertension can lead to a heart attack, heart failure, a stroke, kidney disease, and other problems.    A blood pressure reading consists of a higher number over a lower number. Ideally, your blood pressure should be below 120/80. The first ("top") number is called the systolic pressure. It is a measure of the pressure in your arteries as your heart beats. The second ("bottom") number is called the diastolic pressure. It is a measure of the pressure in your arteries as the heart relaxes.    What are the causes?  The exact cause of this condition is not known. There are some conditions that result in high blood pressure.    What increases the risk?  Certain factors may make you more likely to develop high blood pressure. Some of these risk factors are under your control, including:  Smoking.  Not getting enough exercise or physical activity.  Being overweight.  Having too much fat, sugar, calories, or salt (sodium) in your diet.  Drinking too much alcohol.  Other risk factors include:  Having a personal history of heart disease, diabetes, high cholesterol, or kidney disease.  Stress.  Having a family history of high blood pressure and high cholesterol.  Having obstructive sleep apnea.  Age. The risk increases with age.  What are the signs or symptoms?  High blood pressure may not cause symptoms. Very high blood pressure (hypertensive crisis) may cause:  Headache.  Fast or irregular heartbeats (palpitations).  Shortness of breath.  Nosebleed.  Nausea and vomiting.  Vision changes.  Severe chest pain, dizziness, and seizures.  How is this diagnosed?  This condition is diagnosed by measuring your blood pressure while you are seated, with your arm resting on a flat surface, your legs uncrossed, and your feet flat on the floor. The cuff of the blood pressure monitor will be placed directly against the skin of your upper arm at the level of your heart. Blood pressure should be measured at least twice using the same arm. Certain conditions can cause a difference in blood pressure between your right and left arms.    If you have a high blood pressure reading during one visit or you have normal blood pressure with other risk factors, you may be asked to:  Return on a different day to have your blood pressure checked again.  Monitor your blood pressure at home for 1 week or longer.  If you are diagnosed with hypertension, you may have other blood or imaging tests to help your health care provider understand your overall risk for other conditions.    How is this treated?  This condition is treated by making healthy lifestyle changes, such as eating healthy foods, exercising more, and reducing your alcohol intake. You may be referred for counseling on a healthy diet and physical activity.    Your health care provider may prescribe medicine if lifestyle changes are not enough to get your blood pressure under control and if:  Your systolic blood pressure is above 130.  Your diastolic blood pressure is above 80.  Your personal target blood pressure may vary depending on your medical conditions, your age, and other factors.    Follow these instructions at home:  Eating and drinking    A plate with examples of foods in a healthy diet.  Eat a diet that is high in fiber and potassium, and low in sodium, added sugar, and fat. An example of this eating plan is called the DASH diet. DASH stands for Dietary Approaches to Stop Hypertension. To eat this way:  Eat plenty of fresh fruits and vegetables. Try to fill one half of your plate at each meal with fruits and vegetables.  Eat whole grains, such as whole-wheat pasta, brown rice, or whole-grain bread. Fill about one fourth of your plate with whole grains.  Eat or drink low-fat dairy products, such as skim milk or low-fat yogurt.  Avoid fatty cuts of meat, processed or cured meats, and poultry with skin. Fill about one fourth of your plate with lean proteins, such as fish, chicken without skin, beans, eggs, or tofu.  Avoid pre-made and processed foods. These tend to be higher in sodium, added sugar, and fat.  Reduce your daily sodium intake. Many people with hypertension should eat less than 1,500 mg of sodium a day.  Do not drink alcohol if:  Your health care provider tells you not to drink.  You are pregnant, may be pregnant, or are planning to become pregnant.  If you drink alcohol:  Limit how much you have to:  0–1 drink a day for women.  0–2 drinks a day for men.  Know how much alcohol is in your drink. In the [place] one drink equals one 12 oz bottle of beer (355 mL), one 5 oz glass of wine (148 mL), or one 1½ oz glass of hard liquor (44 mL).  Lifestyle    A blood pressure monitor and cuff.   Work with your health care provider to maintain a healthy body weight or to lose weight. Ask what an ideal weight is for you.  Get at least 30 minutes of exercise that causes your heart to beat faster (aerobic exercise) most days of the week. Activities may include walking, swimming, or biking.  Include exercise to strengthen your muscles (resistance exercise), such as Pilates or lifting weights, as part of your weekly exercise routine. Try to do these types of exercises for 30 minutes at least 3 days a week.  Do not use any products that contain nicotine or tobacco. These products include cigarettes, chewing tobacco, and vaping devices, such as e-cigarettes. If you need help quitting, ask your health care provider.  Monitor your blood pressure at home as told by your health care provider.  Keep all follow-up visits. This is important.  Medicines    Take over-the-counter and prescription medicines only as told by your health care provider. Follow directions carefully. Blood pressure medicines must be taken as prescribed.  Do not skip doses of blood pressure medicine. Doing this puts you at risk for problems and can make the medicine less effective.  Ask your health care provider about side effects or reactions to medicines that you should watch for.  Contact a health care provider if you:  Think you are having a reaction to a medicine you are taking.  Have headaches that keep coming back (recurring).  Feel dizzy.  Have swelling in your ankles.  Have trouble with your vision.  Get help right away if you:  Develop a severe headache or confusion.  Have unusual weakness or numbness.  Feel faint.  Have severe pain in your chest or abdomen.  Vomit repeatedly.  Have trouble breathing.  These symptoms may be an emergency. Get help right away. Call 911.  Do not wait to see if the symptoms will go away.  Do not drive yourself to the hospital.  Summary  Hypertension is when the force of blood pumping through your arteries is too strong. If this condition is not controlled, it may put you at risk for serious complications.  Your personal target blood pressure may vary depending on your medical conditions, your age, and other factors. For most people, a normal blood pressure is less than 120/80.  Hypertension is treated with lifestyle changes, medicines, or a combination of both. Lifestyle changes include losing weight, eating a healthy, low-sodium diet, exercising more, and limiting alcohol.  This information is not intended to replace advice given to you by your health care provider. Make sure you discuss any questions you have with your health care provider.

## 2023-09-07 NOTE — ED PROVIDER NOTE - PHYSICAL EXAMINATION
VSS, awake, alert, non-toxic appearing, oropharynx clear, mmm, chest CTAB, non-labored breathing, no w/r/r, +S1/S2, RRR, no m/r/g, abdomen soft, NT, ND, +BS, no organomegaly or pulsatile masses, no peripheral edema or deformities, equal pulses upper and lower extremities, alert, clear speech.

## 2023-09-07 NOTE — ED PROVIDER NOTE - CLINICAL SUMMARY MEDICAL DECISION MAKING FREE TEXT BOX
Patient presents to the emergency department complaining of high blood pressure. Patient is otherwise asymptomatic without confusion, chest pain, dysuria, vision changes, focal neurological deficit or SOB. Patient is mildly hypertensive here. Doubt hypertenstive emergency, patient with no signs of AMS, pulmonary edema, heart failure, ACS, PRESS syndrome, intracranial hemorrhage, renal infarction or failure or other end organ damage. Plan to discharge patient home with PMD follow up. Return precautions discussed.

## 2023-09-07 NOTE — ED PROVIDER NOTE - NS ED ATTENDING STATEMENT MOD
This was a shared visit with the BEV. I reviewed and verified the documentation and independently performed the documented:

## 2023-09-07 NOTE — ED ADULT NURSE NOTE - NSFALLRISK_ED_ALL_ED
"Requested Prescriptions   Pending Prescriptions Disp Refills     lisinopril-hydrochlorothiazide (PRINZIDE/ZESTORETIC) 20-12.5 MG per tablet [Pharmacy Med Name: Lisinopril-Hydrochlorothiazide Oral Tablet 20-12.5 MG] 30 tablet 10    Last Written Prescription Date:  6/6/17  Last Fill Quantity: 30,  # refills: 11   Last office visit: 6/6/2017 with prescribing provider:  YES   Future Office Visit:     Sig: TAKE ONE TABLET BY MOUTH ONE TIME DAILY    Diuretics (Including Combos) Protocol Failed    6/7/2018  9:24 AM       Failed - Blood pressure under 140/90 in past 12 months    BP Readings from Last 3 Encounters:   06/06/17 118/60   03/24/16 132/74   12/16/15 124/76                Failed - Recent (12 mo) or future (30 days) visit within the authorizing provider's specialty    Patient had office visit in the last 12 months or has a visit in the next 30 days with authorizing provider or within the authorizing provider's specialty.  See \"Patient Info\" tab in inbasket, or \"Choose Columns\" in Meds & Orders section of the refill encounter.           Failed - Normal serum creatinine on file in past 12 months    Recent Labs   Lab Test  06/06/17   1551   CR  0.96             Failed - Normal serum potassium on file in past 12 months    Recent Labs   Lab Test  06/06/17   1551   POTASSIUM  3.8                   Failed - Normal serum sodium on file in past 12 months    Recent Labs   Lab Test  06/06/17   1551   NA  141             Passed - Patient is age 18 or older          " Yes

## 2023-09-07 NOTE — ED PROVIDER NOTE - ATTENDING APP SHARED VISIT CONTRIBUTION OF CARE
I have reviewed and agree with the mid-level note, except as documented in my note below.    72-year-old female history of HLD, CAD, anemia, schizophrenia, PPM, now presents with elevated blood pressure reading this morning, denies exertional chest pain, fever, nausea, vomiting, diaphoresis, hemoptysis, SOB, orthopnea, PND, LE pain or swelling, recent immobilization or travel or other associated complaints at present. Old chart reviewed. I have reviewed and agree with the initial nursing note, except as documented in my note.    VSS, awake, alert, non-toxic appearing, oropharynx clear, mmm, chest CTAB, non-labored breathing, no w/r/r, +S1/S2, RRR, no m/r/g, abdomen soft, NT, ND, +BS, no organomegaly or pulsatile masses, no peripheral edema or deformities, equal pulses upper and lower extremities, alert, clear speech.

## 2023-09-07 NOTE — ED PROVIDER NOTE - PATIENT PORTAL LINK FT
You can access the FollowMyHealth Patient Portal offered by [facility] by registering at the following website: [email or web address]. By joining [employer]’s FollowMyHealth portal, you will also be able to view your health information using other applications (apps) compatible with our system.

## 2023-09-15 NOTE — ED PROVIDER NOTE - OBJECTIVE STATEMENT
Pt with hx of PPM, HLD, HTN, psych presents with GRIFFIN, bilat leg edema for less than 1 week. Denies CP. No hx of CHF. No water pills. admits to chronic cough that has not worsened. (+)smoker.

## 2023-09-15 NOTE — H&P ADULT - NSHPLABSRESULTS_GEN_ALL_CORE
14.3   5.34  )-----------( 170      ( 15 Sep 2023 17:13 )             44.0     09-15    129<L>  |  95<L>  |  15  ----------------------------<  125<H>  tnp   |  23  |  1.3    Ca    8.6      15 Sep 2023 17:13    Urinalysis Basic - ( 15 Sep 2023 17:13 )    Color: x / Appearance: x / SG: x / pH: x  Gluc: 125 mg/dL / Ketone: x  / Bili: x / Urobili: x   Blood: x / Protein: x / Nitrite: x   Leuk Esterase: x / RBC: x / WBC x   Sq Epi: x / Non Sq Epi: x / Bacteria: x      Lactate, Blood: 3.7 mmol/L (09-15 @ 22:19)      RADIOLOGY, EKG & ADDITIONAL TESTS: Reviewed.

## 2023-09-15 NOTE — H&P ADULT - NSICDXPASTMEDICALHX_GEN_ALL_CORE_FT
PAST MEDICAL HISTORY:  Abnormal cholesterol test     Absolute anemia     Acute MI     Acute schizophrenia     CAD, multiple vessel

## 2023-09-15 NOTE — H&P ADULT - NSHPREVIEWOFSYSTEMS_GEN_ALL_CORE
CONSTITUTIONAL: No fevers or chills  EYES/ENT: No visual changes;  No vertigo or throat pain   NECK: No pain or stiffness  RESPIRATORY: No cough, wheezing, hemoptysis;   CARDIOVASCULAR: No chest pain or palpitations  GASTROINTESTINAL: No abdominal or epigastric pain. No nausea, vomiting, or hematemesis; No diarrhea or constipation. No melena or hematochezia.  GENITOURINARY: No dysuria, frequency or hematuria  NEUROLOGICAL: No numbness or weakness  SKIN: No itching, rashes

## 2023-09-15 NOTE — ED PROVIDER NOTE - CLINICAL SUMMARY MEDICAL DECISION MAKING FREE TEXT BOX
72-year-old female present to the ED for shortness of breath, lower extremity edema x1 week.  Initial vitals noted to be 100% on 3 L nasal cannula and noted to drop to 90% on room air.  Physical exam revealed crackles bilaterally along with lower extremity pitting edema.  Concern for CHF exacerbation so we obtained labs along with chest x-ray and EKG.  Chest x-ray revealed cardiomyopathy as well as bilateral effusions with AICD.  Labs revealed hyponatremia along with elevated BNP.  EKG with ventricularly paced rhythm.  Concern for CHF exacerbation.  Given Lasix.  Admitted to telemetry.

## 2023-09-15 NOTE — H&P ADULT - NSHPPHYSICALEXAM_GEN_ALL_CORE
GENERAL: NAD  HEAD:  Atraumatic, Normocephalic  EYES: EOMI, PERRLA, conjunctiva and sclera clear  ENMT: No tonsillar erythema, exudates, or enlargement; Moist mucous membranes  NECK: Supple, No JVD, Normal thyroid  HEART: Regular rate and rhythm; No murmurs, rubs, or gallops  RESPIRATORY: B/L crepts +  ABDOMEN: Soft, Nontender, Nondistended; Bowel sounds present  NEUROLOGY: A&Ox3, nonfocal, moving all extremities  EXTREMITIES:  2+ Peripheral Pulses, No clubbing, cyanosis; mild edema +  SKIN: warm, dry, normal color, no rash or abnormal lesions

## 2023-09-15 NOTE — ED PROVIDER NOTE - PHYSICAL EXAMINATION
CONST: Well appearing in NAD  EYES: PERRL, EOMI, Sclera and conjunctiva clear.   ENT: Oropharynx normal appearing, no erythema or exudates. Uvula midline.  NECK: Non-tender, no meningeal signs (+)JVD  CARD: Normal S1 S2; Normal rate and rhythm  RESP: crackles at bases bilateral  GI: Soft, non-tender, non-distended.  MS: Normal ROM in all extremities. No midline spinal tenderness. bilateral 3 plus edema of calves.   SKIN: Warm, dry, no acute rashes. Good turgor  NEURO: A&Ox3, No focal deficits. Strength 5/5 with no sensory deficits.

## 2023-09-15 NOTE — CHART NOTE - NSCHARTNOTEFT_GEN_A_CORE
Pt seen and examined at bedside. Pt presenting from group home. Has history of CAD/MI, Pacemaker, schizophrenia and substance abuse. Pt is a poor historian but reports she presented to the ER with dizziness for the past week and unable to walk due to dizziness. Reports some SOB but denies LE edema. She denies any head trauma. Pt reports cardiologist is Dr bolton. Pt is sitting comfortable in bed, has no current complains. She is saturating 98% on 2L NC, latest /66 HR 65.  Pt is not a candidate for 4T. Pt signed out to MAR for medicine telemetry admission

## 2023-09-15 NOTE — H&P ADULT - HISTORY OF PRESENT ILLNESS
71 yo female with pmh of HLD, CAD, anemia, schizophrenia, PPM (indication not clear), active smoker now presents with shortness of breath and dizziness.     Pt is a poor historian. She says she's brought in here for weakness and low sodium. On further questioning, she revealed she has been having intermittent dizziness episodes and decreased capacity to walk for the past 5 days due to dyspnea. Denies exertional chest pain, fever, nausea, vomiting, diaphoresis, hemoptysis, orthopnea, PND, LE pain or swelling, recent immobilization or travel or other associated complaints at present. She has no bowel and bladder complaints.    ED vitals - afebrile, BP - 123/78mmhg; HR - 65 /min; SPo2 -100% on 3lpm  ECG - Ventricular paced rhythm @65/min; trop <0.01  Labs - BNP -13.3k; Na- 129; K - no reported ---> rpt sent; rest wnl  Chest x ray - b/l pleural effusion with vascular congestion and cardiomegaly     Pt admitted for CHF management.

## 2023-09-15 NOTE — H&P ADULT - ASSESSMENT
71 yo female with pmh of HLD, CAD, PPM (indication not clear),anemia, schizophrenia and active smoker now presents with shortness of breath and dizziness.       #CHF exacerbation  #h/o CAD   #h/o PPM - paced rhythm now  - SOB on exertion  - ecg - ventricular paced, qtc-548; trop -ve  - BNP -13.3k   - CXR - cardiomegaly and pulm congestion  - lasix 20+20 given, start 40mg IV  - pt passed about 1L urine, says she's comfortable now   - c/w home meds - aspirin, toprol and lisinopril  - f/u electrolytes, trop in AM       #Schizophrenia   - c/w home meds    - qtc 548, pt has PPM       VTE ppx -lovenox 40mg  GI ppx - pantoprazole(home med)  Activity as tolerated/ PT consult   DASH/DM diet   Full Code           71 yo female with pmh of HLD, CAD, PPM (indication not clear),anemia, schizophrenia and active smoker now presents with shortness of breath and dizziness.       #CHF exacerbation  #h/o CAD   #h/o PPM - paced rhythm now  - SOB on exertion  - ecg - ventricular paced, qtc-548; trop -ve  - BNP -13.3k   - CXR - cardiomegaly and pulm congestion  - lasix 20+20 given, start 40mg IV  - pt passed about 1L urine, says she's comfortable now   - c/w home meds - aspirin, toprol and lisinopril  - f/u electrolytes, trop in AM   - lactate 3.7 ----> trend; Pt's /73mmhg  - f/u TTE      #Schizophrenia   - c/w home meds    - qtc 548, pt has PPM       VTE ppx -lovenox 40mg  GI ppx - pantoprazole(home med)  Activity as tolerated/ PT consult   DASH/DM diet   Full Code           73 yo female with pmh of HLD, CAD, PPM (indication not clear),anemia, schizophrenia and active smoker now presents with shortness of breath and dizziness.       #CHF exacerbation  #h/o CAD   #h/o PPM - paced rhythm now  - SOB on exertion  - ecg - ventricular paced, qtc-548; trop -ve  - BNP -13.3k   - CXR - cardiomegaly and pulm congestion  - lasix 20+20 given, start 40mg IV  - pt passed about 1L urine, says she's comfortable now   - c/w home meds - aspirin, toprol and lisinopril  - f/u electrolytes, trop in AM   - lactate 3.7 ----> trend; Pt's /73mmhg  - f/u TTE  - Cardiologist Dr. Gaytan - consider cardio consult in AM      #Schizophrenia   - c/w home meds    - qtc 548, pt has PPM       VTE ppx -lovenox 40mg  GI ppx - pantoprazole(home med)  Activity as tolerated/ PT consult   DASH/DM diet   Full Code

## 2023-09-15 NOTE — ED ADULT NURSE NOTE - NSFALLHARMRISKINTERV_ED_ALL_ED
Assistance OOB with selected safe patient handling equipment if applicable/Assistance with ambulation/Communicate risk of Fall with Harm to all staff, patient, and family/Monitor gait and stability/Provide visual cue: red socks, yellow wristband, yellow gown, etc/Reinforce activity limits and safety measures with patient and family/Bed in lowest position, wheels locked, appropriate side rails in place/Call bell, personal items and telephone in reach/Instruct patient to call for assistance before getting out of bed/chair/stretcher/Non-slip footwear applied when patient is off stretcher/[place] to call system/Physically safe environment - no spills, clutter or unnecessary equipment/Purposeful Proactive Rounding/Room/bathroom lighting operational, light cord in reach

## 2023-09-15 NOTE — ED PROVIDER NOTE - CHRONIC CONDITIONS AFFECTING CARE
[phone]  Patient ask 'what do I need a picture of my head for. Everything else hurts, not my head\".   Refuses to get in wheelchair.--0117 Chronic Conditions Affecting Care

## 2023-09-15 NOTE — ED ADULT NURSE REASSESSMENT NOTE - NS ED NURSE REASSESS COMMENT FT1
pt received from previous rn. cardiac monitoring maintained. denies pain/discomforts. awaiting bed assignment.

## 2023-09-15 NOTE — ED PROVIDER NOTE - WR ORDER ID 2
Plastic Surgeon Procedure Text (A): After obtaining clear surgical margins the patient was sent to plastics for surgical repair.  The patient understands they will receive post-surgical care and follow-up from the referring physician's office. [id number]

## 2023-09-16 NOTE — PHYSICAL THERAPY INITIAL EVALUATION ADULT - GAIT PATTERN USED, PT EVAL
Operative Note      Patient: Dori Peralta  YOB: 1951  MRN: [id number]    Date of Procedure: 3/17/2022    Pre-Op Diagnosis: NECK MASS    Post-Op Diagnosis: Same       Procedure(s):  PANENDOSCOPY WITH BIOPSY    Surgeon(s):  Anil Rivero DO    Assistant:   Surgical Assistant: Jc Nash  Resident: Erin Braun DO; Katie Briceno DO    Anesthesia: General    Estimated Blood Loss (mL): less than 50     Complications: None    Specimens:   ID Type Source Tests Collected by Time Destination   A : LEFT TONGUE BASE Tissue Tissue SURGICAL PATHOLOGY Anil Rivero DO 3/17/2022 1647    B : LEFT TONGUE BASE Tissue Tissue SURGICAL PATHOLOGY Anil Rivero DO 3/17/2022 1651    C : LEFT LEVEL 2 NECK MASS FINE NEEDLE ASPIRATION Tissue Tissue SURGICAL PATHOLOGY Anil Rivero DO 3/17/2022 1703        Implants:  * No implants in log *      Drains: * No LDAs found *    Findings: L lingual tonsil hypertrophy, biopsy negative, left level II neck node FNA performed     Detailed Description of Procedure:   PROCEDURE: The patient was consented preoperatively, taken back to the   operating room and identified appropriately, placed in supine position, given   anesthesia for general intubation with a laser safe tube with which the   patient was intubated. The patient was turned at a 90-degree angle and the head of   the bed was elevated to approximately 3-1/2 to 4 feet off the ground. Panendoscopy. The patient's teeth were protected. he has good  dentition, and a tooth guard protector and saline 4 x 4's were used to protect his   oral cavity tissue, and they were placed on his upper teeth. The esophagoscope was then placed in the patient's oral cavity and taken down to the area of the  cricopharyngeus muscle. The scope was passed through the cricopharyngeus muscle down to the midesophagus. The patient did not have copious secretions of reflux, which was very evident on the esophagoscope.  The esophagoscope was drawn back slowly, there were not masses or abnormalities of the mucosa found. The upper esophageal sphincter was evaluated and this did not show  masses or abnormal mucosa, biopsies were not done. The esophagoscope was removed from the patient's oral cavity. Laryngoscopy    The Dedo was inserted into the oral cavity and the left base of tongue had fullness. Several biopsies were taken. Afrin soaked pledgets used for hemostasis. There were no other abnormalities of the oral cavity, oropharynx or endolarynx that were visible      Bronchoscopy  The pediatric flex bronchoscope was used. The bronchoscope was advanced down to the area of the fuad, past the balloon, which was deflated. Once the scope was down to the fuad, there were notm asses seen. The scope was then drawn back slowly. The patient's endotracheal tube was then reinflated. The left neck mass was palpated near level II, a 18G needle was used to perform an FNA with cystic fluid aspirated. Pt was turned back to anesthesia for awakening. Pt tolerated the procedure well. Dr. Toshia Reagan was present and scrubbed the entire case. The frozen section came back as benign.      Electronically signed by James Barros DO on 3/17/2022 at 5:11 PM 3-point gait

## 2023-09-16 NOTE — CONSULT NOTE ADULT - SUBJECTIVE AND OBJECTIVE BOX
HPI:  73 yo female with pmh of HLD, CAD, anemia, schizophrenia, PPM (indication not clear), active smoker now presents with shortness of breath and dizziness.     Pt is a poor historian. She says she's brought in here for weakness and low sodium. On further questioning, she revealed she has been having intermittent dizziness episodes and decreased capacity to walk for the past 5 days due to dyspnea. Denies exertional chest pain, fever, nausea, vomiting, diaphoresis, hemoptysis, orthopnea, PND, LE pain or swelling, recent immobilization or travel or other associated complaints at present. She has no bowel and bladder complaints.    ED vitals - afebrile, BP - 123/78mmhg; HR - 65 /min; SPo2 -100% on 3lpm  ECG - Ventricular paced rhythm @65/min; trop <0.01  Labs - BNP -13.3k; Na- 129; K - no reported ---> rpt sent; rest wnl  Chest x ray - b/l pleural effusion with vascular congestion and cardiomegaly     Pt admitted for CHF management.  (15 Sep 2023 22:50)    Cardiology consulted for new onset HFrEF with signs and symptoms of pulmonary congestion and volume overload on exam       PAST MEDICAL & SURGICAL HISTORY  Acute schizophrenia    CAD, multiple vessel    Acute MI    Abnormal cholesterol test    Absolute anemia    Artificial pacemaker        FAMILY HISTORY:  FAMILY HISTORY:      SOCIAL HISTORY:  []smoker  []Alcohol  []Drug    ALLERGIES:  eggs (Rash)  No Known Drug Allergies      MEDICATIONS:  MEDICATIONS  (STANDING):  amantadine 100 milliGRAM(s) Oral daily  aspirin enteric coated 81 milliGRAM(s) Oral daily  atorvastatin 10 milliGRAM(s) Oral at bedtime  benztropine 0.5 milliGRAM(s) Oral daily  enoxaparin Injectable 40 milliGRAM(s) SubCutaneous every 24 hours  FLUoxetine 10 milliGRAM(s) Oral daily  folic acid 1 milliGRAM(s) Oral daily  furosemide   Injectable 40 milliGRAM(s) IV Push daily  lisinopril 5 milliGRAM(s) Oral daily  metoprolol succinate ER 25 milliGRAM(s) Oral daily  oxybutynin XL 5 milliGRAM(s) Oral daily  pantoprazole    Tablet 40 milliGRAM(s) Oral two times a day    MEDICATIONS  (PRN):  acetaminophen     Tablet .. 650 milliGRAM(s) Oral every 6 hours PRN Temp greater or equal to 38C (100.4F), Mild Pain (1 - 3)  aluminum hydroxide/magnesium hydroxide/simethicone Suspension 30 milliLiter(s) Oral every 4 hours PRN Dyspepsia  melatonin 3 milliGRAM(s) Oral at bedtime PRN Insomnia  ondansetron Injectable 4 milliGRAM(s) IV Push every 8 hours PRN Nausea and/or Vomiting      HOME MEDICATIONS:  Home Medications:  amantadine 100 mg oral tablet: 1 tab(s) orally once a day (16 Sep 2023 00:01)  Amaryl 1 mg oral tablet: 1 tab(s) orally 2 times a day (16 Sep 2023 00:01)  Aspir 81 oral delayed release tablet: 1 tab(s) orally once a day (13 Jun 2018 09:03)  benztropine 0.5 mg oral tablet: 1 tab(s) orally once a day (16 Sep 2023 00:01)  ferrous fumarate 324 mg (106 mg elemental iron) oral tablet: 1 tab(s) orally once a day (16 Sep 2023 00:01)  fluPHENAZine enanthate 25 mg/mL injectable solution: 25 milligram(s) injectable 2 times a month (16 Sep 2023 00:01)  folic acid 1 mg oral tablet: 1 tab(s) orally once a day (13 Jun 2018 09:03)  glycopyrrolate 1 mg oral tablet: 1 tab(s) orally 2 times a day (16 Sep 2023 00:01)  Lipitor 10 mg oral tablet: 1 tab(s) orally once a day (13 Jun 2018 09:03)  melatonin 10 mg oral tablet: 1 tab(s) orally once a day (at bedtime) (16 Sep 2023 00:01)  metoprolol succinate 25 mg oral tablet, extended release: 1 tab(s) orally once a day (13 Jun 2018 09:03)  oxyBUTYnin 5 mg oral tablet: 1 tab(s) orally once a day (16 Sep 2023 00:01)  Prinivil 5 mg oral tablet: 1 tab(s) orally once a day (13 Jun 2018 09:03)  Protonix 40 mg oral delayed release tablet: 1 tab(s) orally 2 times a day (16 Sep 2023 00:01)  PROzac 10 mg oral tablet: 1 tab(s) orally once a day (13 Jun 2018 09:03)      VITALS:   T(F): 97.8 (09-16 @ 13:25), Max: 97.8 (09-16 @ 05:41)  HR: 65 (09-16 @ 13:25) (65 - 67)  BP: 135/76 (09-16 @ 13:25) (121/73 - 146/65)  BP(mean): 97 (09-16 @ 13:25) (97 - 98)  RR: 18 (09-16 @ 13:25) (18 - 18)  SpO2: 99% (09-16 @ 13:25) (99% - 100%)    I&O's Summary    16 Sep 2023 07:01  -  16 Sep 2023 15:48  --------------------------------------------------------  IN: 0 mL / OUT: 1500 mL / NET: -1500 mL        REVIEW OF SYSTEMS:  CONSTITUTIONAL: No weakness, fevers or chills  EYES: No visual changes  ENT: No vertigo or throat pain   NECK: No pain or stiffness  RESPIRATORY: No cough, wheezing, hemoptysis; No shortness of breath  CARDIOVASCULAR: No chest pain or palpitations  GASTROINTESTINAL: No abdominal or epigastric pain. No nausea, vomiting, or hematemesis; No diarrhea or constipation. No melena or hematochezia.  GENITOURINARY: No dysuria, frequency or hematuria  NEUROLOGICAL: No numbness or weakness  SKIN: No itching, no rashes  MSK: No pain    PHYSICAL EXAM:  NEURO: patient is somnolent, not cooperative, non verbal   GEN: Not in acute distress  NECK: + JVD  LUNGS: distant lung sounds, ++ crackles   CARDIOVASCULAR: unable to hear cardiac sounds  ABD: Soft, non-tender, non-distended, +BS  EXT: + 2 pitting bilateral JIM  SKIN: Intact    LABS:                        14.9   6.21  )-----------( 118      ( 16 Sep 2023 08:21 )             45.9     09-16    139  |  96<L>  |  14  ----------------------------<  127<H>  3.9   |  24  |  1.1    Ca    9.3      16 Sep 2023 08:21  Mg     1.7     09-16    TPro  7.9  /  Alb  3.7  /  TBili  1.8<H>  /  DBili  x   /  AST  29  /  ALT  10  /  AlkPhos  154<H>  09-16      Troponin T, Serum: <0.01 ng/mL (09-16-23 @ 08:21)  Lactate, Blood: 3.7 mmol/L *H* (09-16-23 @ 08:21)  Troponin T, Serum: <0.01 ng/mL (09-16-23 @ 00:57)  Lactate, Blood: 3.7 mmol/L *H* (09-15-23 @ 22:19)  Troponin T, Serum: <0.01 ng/mL (09-15-23 @ 17:13)    CARDIAC MARKERS ( 16 Sep 2023 08:21 )  x     / <0.01 ng/mL / x     / x     / x      CARDIAC MARKERS ( 16 Sep 2023 00:57 )  x     / <0.01 ng/mL / x     / x     / x      CARDIAC MARKERS ( 15 Sep 2023 17:13 )  x     / <0.01 ng/mL / x     / x     / x            Troponin trend:      09-16 Chol 73 LDL -- HDL 28<L> Trig 63      RADIOLOGY:  -CXR:  -TTE:  1. Left ventricular ejection fraction, by visual estimation, is 35 to   40%.   2. Technically difficult study.   3. Severely enlarged right atrium.   4. Abnormal septal motion consistent with left bundle branch block.   5. Spectral Doppler shows impaired relaxation pattern of left   ventricular myocardial filling (Grade I diastolic dysfunction).   6. Moderately enlarged right ventricle.   7. Mildly enlarged left atrium.   8. Degenerative mitral valve.   9. Mild mitral valve regurgitation.  10. Moderate thickening and calcification of the posterior mitral valve   leaflet.  11. Moderately decreased posterior mitral leaflet mobility.  12. Moderate-severe tricuspid regurgitation.  13. TR rafal: 2.09 m/s.  14. Sclerotic aortic valve with decreased opening.  15. Endocardial visualization was enhanced with intravenous echo contrast.  -CCTA:  -STRESS TEST:  -CATHETERIZATION:    ECG: PACED rhythm with LBBB morphology     TELEMETRY EVENTS:   HPI:  73 yo female with pmh of HLD, CAD, anemia, schizophrenia, PPM (indication not clear), active smoker now presents with shortness of breath and dizziness.     Pt is a poor historian. She says she's brought in here for weakness and low sodium. On further questioning, she revealed she has been having intermittent dizziness episodes and decreased capacity to walk for the past 5 days due to dyspnea. Denies exertional chest pain, fever, nausea, vomiting, diaphoresis, hemoptysis, orthopnea, PND, LE pain or swelling, recent immobilization or travel or other associated complaints at present. She has no bowel and bladder complaints.    ED vitals - afebrile, BP - 123/78mmhg; HR - 65 /min; SPo2 -100% on 3lpm  ECG - Ventricular paced rhythm @65/min; trop <0.01  Labs - BNP -13.3k; Na- 129; K - no reported ---> rpt sent; rest wnl  Chest x ray - b/l pleural effusion with vascular congestion and cardiomegaly     Pt admitted for CHF management.  (15 Sep 2023 22:50)    Cardiology consulted for new onset HFrEF with signs and symptoms of pulmonary congestion and volume overload on exam       PAST MEDICAL & SURGICAL HISTORY  Acute schizophrenia    CAD, multiple vessel    Acute MI    Abnormal cholesterol test    Absolute anemia    Artificial pacemaker      ALLERGIES:  eggs (Rash)  No Known Drug Allergies      MEDICATIONS:  MEDICATIONS  (STANDING):  amantadine 100 milliGRAM(s) Oral daily  aspirin enteric coated 81 milliGRAM(s) Oral daily  atorvastatin 10 milliGRAM(s) Oral at bedtime  benztropine 0.5 milliGRAM(s) Oral daily  enoxaparin Injectable 40 milliGRAM(s) SubCutaneous every 24 hours  FLUoxetine 10 milliGRAM(s) Oral daily  folic acid 1 milliGRAM(s) Oral daily  furosemide   Injectable 40 milliGRAM(s) IV Push daily  lisinopril 5 milliGRAM(s) Oral daily  metoprolol succinate ER 25 milliGRAM(s) Oral daily  oxybutynin XL 5 milliGRAM(s) Oral daily  pantoprazole    Tablet 40 milliGRAM(s) Oral two times a day    MEDICATIONS  (PRN):  acetaminophen     Tablet .. 650 milliGRAM(s) Oral every 6 hours PRN Temp greater or equal to 38C (100.4F), Mild Pain (1 - 3)  aluminum hydroxide/magnesium hydroxide/simethicone Suspension 30 milliLiter(s) Oral every 4 hours PRN Dyspepsia  melatonin 3 milliGRAM(s) Oral at bedtime PRN Insomnia  ondansetron Injectable 4 milliGRAM(s) IV Push every 8 hours PRN Nausea and/or Vomiting      HOME MEDICATIONS:  Home Medications:  amantadine 100 mg oral tablet: 1 tab(s) orally once a day (16 Sep 2023 00:01)  Amaryl 1 mg oral tablet: 1 tab(s) orally 2 times a day (16 Sep 2023 00:01)  Aspir 81 oral delayed release tablet: 1 tab(s) orally once a day (13 Jun 2018 09:03)  benztropine 0.5 mg oral tablet: 1 tab(s) orally once a day (16 Sep 2023 00:01)  ferrous fumarate 324 mg (106 mg elemental iron) oral tablet: 1 tab(s) orally once a day (16 Sep 2023 00:01)  fluPHENAZine enanthate 25 mg/mL injectable solution: 25 milligram(s) injectable 2 times a month (16 Sep 2023 00:01)  folic acid 1 mg oral tablet: 1 tab(s) orally once a day (13 Jun 2018 09:03)  glycopyrrolate 1 mg oral tablet: 1 tab(s) orally 2 times a day (16 Sep 2023 00:01)  Lipitor 10 mg oral tablet: 1 tab(s) orally once a day (13 Jun 2018 09:03)  melatonin 10 mg oral tablet: 1 tab(s) orally once a day (at bedtime) (16 Sep 2023 00:01)  metoprolol succinate 25 mg oral tablet, extended release: 1 tab(s) orally once a day (13 Jun 2018 09:03)  oxyBUTYnin 5 mg oral tablet: 1 tab(s) orally once a day (16 Sep 2023 00:01)  Prinivil 5 mg oral tablet: 1 tab(s) orally once a day (13 Jun 2018 09:03)  Protonix 40 mg oral delayed release tablet: 1 tab(s) orally 2 times a day (16 Sep 2023 00:01)  PROzac 10 mg oral tablet: 1 tab(s) orally once a day (13 Jun 2018 09:03)      VITALS:   T(F): 97.8 (09-16 @ 13:25), Max: 97.8 (09-16 @ 05:41)  HR: 65 (09-16 @ 13:25) (65 - 67)  BP: 135/76 (09-16 @ 13:25) (121/73 - 146/65)  BP(mean): 97 (09-16 @ 13:25) (97 - 98)  RR: 18 (09-16 @ 13:25) (18 - 18)  SpO2: 99% (09-16 @ 13:25) (99% - 100%)    I&O's Summary    16 Sep 2023 07:01  -  16 Sep 2023 15:48  --------------------------------------------------------  IN: 0 mL / OUT: 1500 mL / NET: -1500 mL        REVIEW OF SYSTEMS:  Unable to obtain - unreliable historian    PHYSICAL EXAM:  GEN: NAD  NECK: + JVD  LUNGS: distant lung sounds, ++ crackles   CARDIOVASCULAR: RRR  ABD: Soft, non-tender, non-distended  EXT: + 2 pitting bilateral JIM  SKIN: Intact      LABS:                        14.9   6.21  )-----------( 118      ( 16 Sep 2023 08:21 )             45.9     09-16    139  |  96<L>  |  14  ----------------------------<  127<H>  3.9   |  24  |  1.1    Ca    9.3      16 Sep 2023 08:21  Mg     1.7     09-16    TPro  7.9  /  Alb  3.7  /  TBili  1.8<H>  /  DBili  x   /  AST  29  /  ALT  10  /  AlkPhos  154<H>  09-16      Troponin T, Serum: <0.01 ng/mL (09-16-23 @ 08:21)  Troponin T, Serum: <0.01 ng/mL (09-16-23 @ 00:57)  Troponin T, Serum: <0.01 ng/mL (09-15-23 @ 17:13)      -TTE:  1. Left ventricular ejection fraction, by visual estimation, is 35 to   40%.   2. Technically difficult study.   3. Severely enlarged right atrium.   4. Abnormal septal motion consistent with left bundle branch block.   5. Spectral Doppler shows impaired relaxation pattern of left   ventricular myocardial filling (Grade I diastolic dysfunction).   6. Moderately enlarged right ventricle.   7. Mildly enlarged left atrium.   8. Degenerative mitral valve.   9. Mild mitral valve regurgitation.  10. Moderate thickening and calcification of the posterior mitral valve   leaflet.  11. Moderately decreased posterior mitral leaflet mobility.  12. Moderate-severe tricuspid regurgitation.  13. TR rafal: 2.09 m/s.  14. Sclerotic aortic valve with decreased opening.  15. Endocardial visualization was enhanced with intravenous echo contrast.      ECG: PACED rhythm with LBBB morphology

## 2023-09-16 NOTE — PROGRESS NOTE ADULT - ASSESSMENT
71 yo female with pmh of HLD, CAD, PPM (indication not clear), schizophrenia and active smoker now presents with shortness of breath and dizziness.       #HFrEF- unknown new - echo 9/16-> ef of 35-40%- EKG and trop unremarkable   #severe TR regurg  #lactic acidemia -> dec perfusion ?   #h/o CAD   #h/o PPM - paced rhythm now  - CXR and echo reviewed   - continue lasix 40 IV   - cardio consult   - c/w aspirin, toprol and lisinopril  - trend lactate monitor BP     #Schizophrenia   - c/w home meds    - qtc 548, pt has PPM       VTE ppx -lovenox 40mg  GI ppx - pantoprazole(home med)  Activity as tolerated/ PT consult   DASH/DM diet   Full Code

## 2023-09-16 NOTE — PHYSICAL THERAPY INITIAL EVALUATION ADULT - PERTINENT HX OF CURRENT PROBLEM, REHAB EVAL
73 yo female with pmh of HLD, CAD, anemia, schizophrenia, PPM (indication not clear), active smoker now presents with shortness of breath and dizziness.

## 2023-09-16 NOTE — PHYSICAL THERAPY INITIAL EVALUATION ADULT - GENERAL OBSERVATIONS, REHAB EVAL
9:00-9:25am Pt encountered supine in bed, A & O x 1 in NAD, +IV, +O2 2L via NC, no c/o pain and agreeable to PT. Pt requires Mod A in bed mobility, Mod A in transfer mobility and ambulated 2 small steps Max A towards the chair. Pt demonstrated impaired mobility, poor standing balance and low endurance secondary to gen weakness. Pt left seated OOB in a recliner chair in NAD, RN Magan aware. Pt will benefit from skilled PT 3-5x/wk for thera ex, functional mobility training , balance and gait training.

## 2023-09-16 NOTE — PROGRESS NOTE ADULT - SUBJECTIVE AND OBJECTIVE BOX
SUBJECTIVE:    Patient is a 72y old Female who presents with a chief complaint of Shortness of breath and Dizziness (15 Sep 2023 22:50)    Currently admitted to medicine with the primary diagnosis of: HF    Today is hospital day 1d.     Overnight Events:     Interval Events:    symptoms improving on lasix. patient is diuresing well. . patient is tolerating po intake, patient is having BM, and urination. Patient is ambutlating.        PAST MEDICAL & SURGICAL HISTORY  Acute schizophrenia    CAD, multiple vessel    Acute MI    Abnormal cholesterol test    Absolute anemia    Artificial pacemaker      ALLERGIES:  eggs (Rash)  No Known Drug Allergies    MEDICATIONS:  STANDING MEDICATIONS  amantadine 100 milliGRAM(s) Oral daily  aspirin enteric coated 81 milliGRAM(s) Oral daily  atorvastatin 10 milliGRAM(s) Oral at bedtime  benztropine 0.5 milliGRAM(s) Oral daily  enoxaparin Injectable 40 milliGRAM(s) SubCutaneous every 24 hours  FLUoxetine 10 milliGRAM(s) Oral daily  folic acid 1 milliGRAM(s) Oral daily  furosemide   Injectable 40 milliGRAM(s) IV Push daily  lisinopril 5 milliGRAM(s) Oral daily  metoprolol succinate ER 25 milliGRAM(s) Oral daily  oxybutynin XL 5 milliGRAM(s) Oral daily  pantoprazole    Tablet 40 milliGRAM(s) Oral two times a day    PRN MEDICATIONS  acetaminophen     Tablet .. 650 milliGRAM(s) Oral every 6 hours PRN  aluminum hydroxide/magnesium hydroxide/simethicone Suspension 30 milliLiter(s) Oral every 4 hours PRN  melatonin 3 milliGRAM(s) Oral at bedtime PRN  ondansetron Injectable 4 milliGRAM(s) IV Push every 8 hours PRN    VITALS:   ICU Vital Signs Last 24 Hrs  T(C): 36.6 (16 Sep 2023 13:25), Max: 36.6 (16 Sep 2023 05:41)  T(F): 97.8 (16 Sep 2023 13:25), Max: 97.8 (16 Sep 2023 05:41)  HR: 65 (16 Sep 2023 13:25) (65 - 67)  BP: 135/76 (16 Sep 2023 13:25) (121/73 - 146/65)  BP(mean): 97 (16 Sep 2023 13:25) (97 - 98)  RR: 18 (16 Sep 2023 13:25) (18 - 18)  SpO2: 99% (16 Sep 2023 13:25) (99% - 100%)    O2 Parameters below as of 16 Sep 2023 13:25  Patient On (Oxygen Delivery Method): nasal cannula      LABS:                        14.9   6.21  )-----------( 118      ( 16 Sep 2023 08:21 )             45.9     09-16    139  |  96<L>  |  14  ----------------------------<  127<H>  3.9   |  24  |  1.1    Ca    9.3      16 Sep 2023 08:21  Mg     1.7     09-16    TPro  7.9  /  Alb  3.7  /  TBili  1.8<H>  /  DBili  x   /  AST  29  /  ALT  10  /  AlkPhos  154<H>  09-16      Urinalysis Basic - ( 16 Sep 2023 08:21 )    Color: x / Appearance: x / SG: x / pH: x  Gluc: 127 mg/dL / Ketone: x  / Bili: x / Urobili: x   Blood: x / Protein: x / Nitrite: x   Leuk Esterase: x / RBC: x / WBC x   Sq Epi: x / Non Sq Epi: x / Bacteria: x        Troponin T, Serum: <0.01 ng/mL (09-16-23 @ 08:21)  Lactate, Blood: 3.7 mmol/L *H* (09-16-23 @ 08:21)  Troponin T, Serum: <0.01 ng/mL (09-16-23 @ 00:57)  Lactate, Blood: 3.7 mmol/L *H* (09-15-23 @ 22:19)  Troponin T, Serum: <0.01 ng/mL (09-15-23 @ 17:13)      CARDIAC MARKERS ( 16 Sep 2023 08:21 )  x     / <0.01 ng/mL / x     / x     / x      CARDIAC MARKERS ( 16 Sep 2023 00:57 )  x     / <0.01 ng/mL / x     / x     / x      CARDIAC MARKERS ( 15 Sep 2023 17:13 )  x     / <0.01 ng/mL / x     / x     / x            RADIOLOGY:    < from: TTE Echo Complete w/ Contrast w/ Doppler (09.16.23 @ 10:14) >  Summary:   1. Left ventricular ejection fraction, by visual estimation, is 35 to   40%.   2. Technically difficult study.   3. Severely enlarged right atrium.   4. Abnormal septal motion consistent with left bundle branch block.   5.Spectral Doppler shows impaired relaxation pattern of left   ventricular myocardial filling (Grade I diastolic dysfunction).   6. Moderately enlarged right ventricle.   7. Mildly enlarged left atrium.   8. Degenerative mitral valve.   9. Mild mitral valve regurgitation.  10. Moderate thickening and calcification of the posterior mitral valve   leaflet.  11. Moderately decreased posterior mitral leaflet mobility.  12. Moderate-severe tricuspid regurgitation.  13. TR rafal: 2.09 m/s.  14. Sclerotic aortic valve with decreased opening.  15. Endocardial visualization was enhanced with intravenous echo contrast.    < end of copied text >    < from: Xray Chest 1 View AP/PA (09.15.23 @ 17:09) >  Impression:    Enlarged cardiac silhouette and bilateral interstitial opacities which   may reflect interstitial edema.  Small bilateral pleural effusions.    < end of copied text >      PHYSICAL EXAM:    GENERAL: NAD, lying in bed comfortably  CHEST/LUNG: b/l crackles   HEART: Regular rate and rhythm; No murmurs, rubs, or gallops  ABDOMEN: Bowel sounds present; Soft, Nontender, Nondistended. No hepatomegally  EXTREMITIES:  cyanotic tips of toes and finger. slightly cold to touch  NERVOUS SYSTEM:  Alert & Oriented X3, speech clear. No deficits   MSK: FROM all 4 extremities, full and equal strength  SKIN: No rashes or lesions

## 2023-09-16 NOTE — CONSULT NOTE ADULT - ATTENDING COMMENTS
72 F with Schizophrenia and currently unable to communicate.    Acute HFrEF 35-40%  Mod -severe TR      - Increase Lasix to 40 mg IV q12      Incomplete 72 F with Schizophrenia, s/p PPM.  Unable to obtain any relevant history.    Acute HFrEF 35-40%  No NSTEMI / ACS    Cr  1.1      Recommend:  - Increase Lasix to 40 mg IV q12  - Aspirin / statin  - Continue Metoprolol  - PPM interrogation

## 2023-09-16 NOTE — CONSULT NOTE ADULT - ASSESSMENT
73 yo female with pmh of HLD, CAD, anemia, schizophrenia, PPM (indication not clear), active smoker now presents with shortness of breath.   was found to have new HFrEf detected on TTE with signs and symptoms of massive volume overload and lung congestion on CXR requiring oxygen support  Cardiology consulted for HFrEF     # Impression:  - HFrEF with EF 35%-40% with no previous TTE for comparison   - Dual chamber PPM placed by Dr Gaytan   - CAD  - HLD  - Schizophrenia  - Dementia??     # Recs:   - Keep patient on telemetry   - EKG V paced rhythm with LBBB morphology, 3 sets trop negative  - keep on IV diuresis, acc I & O, daily body weight  - cw metoprolol and lisinopril for now   - send for blood cx, urine cx , procal and CRP. r/o sepsis   - may benefit from ischemic work up once she is euvolemic, but she does not have any health care proxy, she can't take any decision right now.  - PCP at [facility] is Dr Hebert: [phone]. Get OP records for work up done in the past before taking any further decision  - spoke to her Brother Clinton who states that she is usually more oriented and functional at baseline, otherwise he does not know anything about her medical condition except that she has schizophrenia.  71 yo female with pmh of HLD, CAD, anemia, schizophrenia, PPM (indication not clear), active smoker now presents with shortness of breath.  Found to have new HFrEf detected on TTE with signs and symptoms of massive volume overload and lung congestion on CXR requiring oxygen support.    Cardiology consulted for HFrEF.      # Impression:  - HFrEF with EF 35%-40% with no previous TTE for comparison   - Dual chamber PPM placed by Dr Gaytan   - CAD  - HLD  - Schizophrenia  - Dementia??     EKG: V paced rhythm with LBBB morphology  3 sets trop negative      # Recs:   - Admit to telemetry   - IV diuresis  - I & O, daily body weight  - c/w metoprolol and lisinopril for now   - send for blood cx, urine cx , procal and CRP  - may benefit from ischemic work up once she is euvolemic, but she does not have any health care proxy and can't make any decisions now    PCP at [facility] is Dr. Hebert: [phone]  Get OP records for work up done in the past before taking any further decision.  Spoke to her Brother Clinton who states that she is usually more oriented and functional at baseline, otherwise he does not know anything about her medical condition except that she has schizophrenia.

## 2023-09-17 NOTE — PROGRESS NOTE ADULT - ASSESSMENT
71 yo female with pmh of HLD, CAD, PPM , schizophrenia and active smoker now presents with shortness of breath.      #HFrEF- unknown new - echo 9/16-> ef of 35-40%- EKG and trop unremarkable   #severe TR regurg  #lactic acidemia -> dec perfusion ?   #h/o CAD   #h/o PPM - paced rhythm now  - CXR and echo reviewed   - continue lasix, increase to 40 mg BID   - daily weight   - monitor Isand Os   - cardio following   - c/w aspirin, toprol and lisinopril    # Hx of COPD?  nebs PRN q6     #Schizophrenia   - c/w home meds    - qtc 548, pt has PPM     #Progress Note Handoff  Pending (specify):  IV lasix, PT eval, c/w tele

## 2023-09-17 NOTE — SWALLOW BEDSIDE ASSESSMENT ADULT - SLP GENERAL OBSERVATIONS
Pt found laying in bed a&ox3. Pt denies any hx of dysphagia. Pt states she eats solids w/o dentures. Pt found laying in bed a&ox3. Pt on 2L O2 via NC. Pt denies any hx of dysphagia. Pt states she eats solids w/o dentures.

## 2023-09-17 NOTE — PATIENT PROFILE ADULT - FUNCTIONAL ASSESSMENT - DAILY ACTIVITY 1.
FOLLOW-UP VISIT    Patient Name: Eduardo Rubio      : 1960     Age: 59 year old        ______________________________________________________________________________     Chief Complaint   Patient presents with     Cancer     Pt is here for a follow up:     BP (!) 148/84   Pulse 64   Wt 74.7 kg (164 lb 11.2 oz)   BMI 22.34 kg/m       Date Radiation Completed: Rt maxillary sinus 6000 cGy completed 19    Pain  Current history of pain associated with this visit:   Intensity: 2.5/10  Current: aching  Location: Rt face and jaw  Treatment: Oxycodone 10 mg 4 times/day    Labs  Other Labs: No    Imaging  None      Dental:   Most Recent Dental Visit: not since radiation finished    Speech/Swallowing:   Most Recent evaluation or testing: Seeing them today   Swallowing Restrictions: No difficulties with swallowing    Trismus/Jaw Exercises: daily +    Nutrition:  Oral Intake: Occ. Pain with swallowing that needs medication  Weight:   Wt Readings from Last 3 Encounters:   20 74.7 kg (164 lb 11.2 oz)   20 74.7 kg (164 lb 11.2 oz)   19 73.7 kg (162 lb 8 oz)         Oral Symptoms:   Xerostomia:0- None  Dysphagia: 0-None  Mucositis Oral Symptoms: 0-None  Mucositis: 0- None  Esophagitis:0- None    Keeps bottle water by the bedside at night.    Other Appointments: Yes    MD Name: Lincoln Appointment Date: today   MD Name:Speech Appointment Date:   MD Name:Saba Ruggiero Appointment Date:   Other Appointment Notes:     Residual Radiation side effect: Energy continues to be low. Skin continues to heal and using Aquaphor BID     Additional Instructions: Audio test today    Nurse face-to-face time: Level 3:  10 min face to face time        
2 = A lot of assistance

## 2023-09-17 NOTE — PROGRESS NOTE ADULT - SUBJECTIVE AND OBJECTIVE BOX
SARAH VERGARA 72y Female  MRN#: [id number]   CODE STATUS:________      SUBJECTIVE  Patient is a 72y old Female who presents with a chief complaint of Shortness of breath and Dizziness (16 Sep 2023 14:29)  Currently admitted to medicine with the primary diagnosis of Acute CHF    Hospital course has been complicated by _______.   Today is hospital day 2d, and this morning she is _________ and reports ________ overnight events.     Present Today:           Gonzalez Catheter ()No/ ()Yes? Indication:          Central Line ()No/ ()Yes? Indication:          IV Fluids ()No/ ()Yes? Type:  Rate:  Indication:      OBJECTIVE  PAST MEDICAL & SURGICAL HISTORY  Acute schizophrenia    CAD, multiple vessel    Acute MI    Abnormal cholesterol test    Absolute anemia    Artificial pacemaker      ALLERGIES:  eggs (Rash)  No Known Drug Allergies    MEDICATIONS:  STANDING MEDICATIONS  amantadine 100 milliGRAM(s) Oral daily  aspirin enteric coated 81 milliGRAM(s) Oral daily  atorvastatin 10 milliGRAM(s) Oral at bedtime  benztropine 0.5 milliGRAM(s) Oral daily  enoxaparin Injectable 40 milliGRAM(s) SubCutaneous every 24 hours  FLUoxetine 10 milliGRAM(s) Oral daily  folic acid 1 milliGRAM(s) Oral daily  furosemide   Injectable 40 milliGRAM(s) IV Push daily  lisinopril 5 milliGRAM(s) Oral daily  metoprolol succinate ER 25 milliGRAM(s) Oral daily  oxybutynin XL 5 milliGRAM(s) Oral daily  pantoprazole    Tablet 40 milliGRAM(s) Oral two times a day    PRN MEDICATIONS  acetaminophen     Tablet .. 650 milliGRAM(s) Oral every 6 hours PRN  aluminum hydroxide/magnesium hydroxide/simethicone Suspension 30 milliLiter(s) Oral every 4 hours PRN  melatonin 3 milliGRAM(s) Oral at bedtime PRN  ondansetron Injectable 4 milliGRAM(s) IV Push every 8 hours PRN      VITAL SIGNS: Last 24 Hours  T(C): 36.8 (17 Sep 2023 09:22), Max: 36.8 (17 Sep 2023 09:22)  T(F): 98.2 (17 Sep 2023 09:22), Max: 98.2 (17 Sep 2023 09:22)  HR: 65 (17 Sep 2023 09:22) (65 - 68)  BP: 126/82 (17 Sep 2023 09:22) (126/82 - 156/87)  BP(mean): 98 (17 Sep 2023 05:34) (97 - 112)  RR: 18 (17 Sep 2023 09:22) (18 - 18)  SpO2: 96% (17 Sep 2023 09:22) (96% - 100%)    LABS:                        15.2   5.26  )-----------( 146      ( 17 Sep 2023 07:52 )             47.0     09-17    139  |  100  |  13  ----------------------------<  130<H>  5.1<H>   |  30  |  1.1    Ca    8.8      17 Sep 2023 07:52  Mg     2.1     09-17    TPro  7.5  /  Alb  3.6  /  TBili  1.8<H>  /  DBili  x   /  AST  35  /  ALT  8   /  AlkPhos  140<H>  09-17      Urinalysis Basic - ( 17 Sep 2023 07:52 )    Color: x / Appearance: x / SG: x / pH: x  Gluc: 130 mg/dL / Ketone: x  / Bili: x / Urobili: x   Blood: x / Protein: x / Nitrite: x   Leuk Esterase: x / RBC: x / WBC x   Sq Epi: x / Non Sq Epi: x / Bacteria: x        Lactate, Blood: 1.7 mmol/L (09-17-23 @ 07:52)  Lactate, Blood: 2.6 mmol/L *H* (09-16-23 @ 16:42)      CARDIAC MARKERS ( 16 Sep 2023 08:21 )  x     / <0.01 ng/mL / x     / x     / x      CARDIAC MARKERS ( 16 Sep 2023 00:57 )  x     / <0.01 ng/mL / x     / x     / x      CARDIAC MARKERS ( 15 Sep 2023 17:13 )  x     / <0.01 ng/mL / x     / x     / x          RADIOLOGY:    < from: Xray Chest 1 View AP/PA (09.15.23 @ 17:09) >    Impression:    Enlarged cardiac silhouette and bilateral interstitial opacities which   may reflect interstitial edema.  Small bilateral pleural effusions.    < end of copied text >  < from: TTE Echo Complete w/ Contrast w/ Doppler (09.16.23 @ 10:14) >   1. Left ventricular ejection fraction, by visual estimation, is 35 to   40%.    < end of copied text >    PHYSICAL EXAM:    GENERAL: not in distress   HEENT:  Atraumatic, Normocephalic  PULMONARY:b/l crackles   CARDIOVASCULAR: Regular rate and rhythm;  GASTROINTESTINAL: Soft, Nontender, Nondistended  MUSCULOSKELETAL: +1 edema  NEUROLOGY: AAOx3  SKIN: No rashes or lesions   SARAH VERGARA 72y Female  MRN#: [id number]       SUBJECTIVE  Patient is a 72y old Female who presents with a chief complaint of Shortness of breath and Dizziness (16 Sep 2023 14:29)  Currently admitted to medicine with the primary diagnosis of Acute CHF complaints of SOB no chest pain no overnight events       OBJECTIVE  PAST MEDICAL & SURGICAL HISTORY  Acute schizophrenia    CAD, multiple vessel    Acute MI    Abnormal cholesterol test    Absolute anemia    Artificial pacemaker      ALLERGIES:  eggs (Rash)  No Known Drug Allergies    MEDICATIONS:  STANDING MEDICATIONS  amantadine 100 milliGRAM(s) Oral daily  aspirin enteric coated 81 milliGRAM(s) Oral daily  atorvastatin 10 milliGRAM(s) Oral at bedtime  benztropine 0.5 milliGRAM(s) Oral daily  enoxaparin Injectable 40 milliGRAM(s) SubCutaneous every 24 hours  FLUoxetine 10 milliGRAM(s) Oral daily  folic acid 1 milliGRAM(s) Oral daily  furosemide   Injectable 40 milliGRAM(s) IV Push daily  lisinopril 5 milliGRAM(s) Oral daily  metoprolol succinate ER 25 milliGRAM(s) Oral daily  oxybutynin XL 5 milliGRAM(s) Oral daily  pantoprazole    Tablet 40 milliGRAM(s) Oral two times a day    PRN MEDICATIONS  acetaminophen     Tablet .. 650 milliGRAM(s) Oral every 6 hours PRN  aluminum hydroxide/magnesium hydroxide/simethicone Suspension 30 milliLiter(s) Oral every 4 hours PRN  melatonin 3 milliGRAM(s) Oral at bedtime PRN  ondansetron Injectable 4 milliGRAM(s) IV Push every 8 hours PRN      VITAL SIGNS: Last 24 Hours  T(C): 36.8 (17 Sep 2023 09:22), Max: 36.8 (17 Sep 2023 09:22)  T(F): 98.2 (17 Sep 2023 09:22), Max: 98.2 (17 Sep 2023 09:22)  HR: 65 (17 Sep 2023 09:22) (65 - 68)  BP: 126/82 (17 Sep 2023 09:22) (126/82 - 156/87)  BP(mean): 98 (17 Sep 2023 05:34) (97 - 112)  RR: 18 (17 Sep 2023 09:22) (18 - 18)  SpO2: 96% (17 Sep 2023 09:22) (96% - 100%)    LABS:                        15.2   5.26  )-----------( 146      ( 17 Sep 2023 07:52 )             47.0     09-17    139  |  100  |  13  ----------------------------<  130<H>  5.1<H>   |  30  |  1.1    Ca    8.8      17 Sep 2023 07:52  Mg     2.1     09-17    TPro  7.5  /  Alb  3.6  /  TBili  1.8<H>  /  DBili  x   /  AST  35  /  ALT  8   /  AlkPhos  140<H>  09-17      Urinalysis Basic - ( 17 Sep 2023 07:52 )    Color: x / Appearance: x / SG: x / pH: x  Gluc: 130 mg/dL / Ketone: x  / Bili: x / Urobili: x   Blood: x / Protein: x / Nitrite: x   Leuk Esterase: x / RBC: x / WBC x   Sq Epi: x / Non Sq Epi: x / Bacteria: x        Lactate, Blood: 1.7 mmol/L (09-17-23 @ 07:52)  Lactate, Blood: 2.6 mmol/L *H* (09-16-23 @ 16:42)      CARDIAC MARKERS ( 16 Sep 2023 08:21 )  x     / <0.01 ng/mL / x     / x     / x      CARDIAC MARKERS ( 16 Sep 2023 00:57 )  x     / <0.01 ng/mL / x     / x     / x      CARDIAC MARKERS ( 15 Sep 2023 17:13 )  x     / <0.01 ng/mL / x     / x     / x          RADIOLOGY:    < from: Xray Chest 1 View AP/PA (09.15.23 @ 17:09) >    Impression:    Enlarged cardiac silhouette and bilateral interstitial opacities which   may reflect interstitial edema.  Small bilateral pleural effusions.    < end of copied text >  < from: TTE Echo Complete w/ Contrast w/ Doppler (09.16.23 @ 10:14) >   1. Left ventricular ejection fraction, by visual estimation, is 35 to   40%.    < end of copied text >    PHYSICAL EXAM:    GENERAL: not in distress   HEENT:  Atraumatic, Normocephalic  PULMONARY:b/l crackles   CARDIOVASCULAR: Regular rate and rhythm;  GASTROINTESTINAL: Soft, Nontender, Nondistended  MUSCULOSKELETAL: +1 edema  NEUROLOGY: AAOx3  SKIN: No rashes or lesions

## 2023-09-17 NOTE — SWALLOW BEDSIDE ASSESSMENT ADULT - SWALLOW EVAL: DIAGNOSIS
+ toleration of minced & moist w/ thin liquids w/o overt s/s of aspiration/penetration. Overt s/s of aspiration/penetration w/ thin liquids. + toleration of minced & moist w/ mildly thick liquids w/o overt s/s of aspiration/penetration.

## 2023-09-17 NOTE — SWALLOW BEDSIDE ASSESSMENT ADULT - NS SPL SWALLOW CLINIC TRIAL FT
Pt presents w/ no overt s/s of aspiration/penetration while consuming minced & moist w/ thin liquids. Pt presents w/ no overt s/s of aspiration/penetration while consuming minced & moist w/ thin liquids. No further PO trials 2/2 generalized weakness. Pt presents w/ no overt s/s of aspiration/penetration while consuming minced & moist w/ mildly thick liquids. No further PO trials 2/2 generalized weakness.

## 2023-09-17 NOTE — SWALLOW BEDSIDE ASSESSMENT ADULT - COMMENTS
CXR 9/15/23- Enlarged cardiac silhouette and bilateral interstitial opacities which may reflect interstitial edema. Small bilateral pleural effusions. Pt presents w/ overt s/s of aspiration/penetration while consuming mildly thick liquids. Pt presents w/ overt s/s of aspiration/penetration while consuming thin liquids.

## 2023-09-18 NOTE — PROGRESS NOTE ADULT - ASSESSMENT
71 yo female with pmh of HLD, CAD, PPM , schizophrenia and active smoker now presents with shortness of breath.      #HFrEF- unknown new - echo 9/16-> ef of 35-40%- EKG and trop unremarkable   #severe TR regurg  #lactic acidemia -> dec perfusion ?   #h/o CAD   #h/o PPM - paced rhythm now  - CXR and echo reviewed   - continue lasix 40 mg BID   - daily weight   - monitor Is and Os   - cardio following   - c/w aspirin, toprol and lisinopril  -EP for pacemaker interrogation    # Hx of COPD?  nebs PRN q6     #Schizophrenia   - c/w home meds    - qtc 548, pt has PPM     #hypomagnesemia  -repleted    Diet - soft and bite sized  DVT ppx - Lovenox  Dispo- [place]   73 yo female with pmh of HLD, CAD, PPM , schizophrenia and active smoker now presents with shortness of breath.      #HFrEF- unknown new - echo 9/16-> ef of 35-40%- EKG and trop unremarkable   #severe TR regurg  #lactic acidemia -> dec perfusion ?   #h/o CAD   #h/o PPM - paced rhythm now  - CXR and echo reviewed   - continue lasix 40 mg BID   - daily weight   - monitor Is and Os   - cardio following   - c/w aspirin, toprol and lisinopril  -EP for pacemaker interrogation    # Hx of COPD?  nebs PRN q6     #Cough  -Procal and RVP    #Schizophrenia   - c/w home meds    - qtc 548, pt has PPM     #hypomagnesemia  -repleted    Diet - soft and bite sized  DVT ppx - Lovenox  Dispo- [facility]

## 2023-09-18 NOTE — CONSULT NOTE ADULT - SUBJECTIVE AND OBJECTIVE BOX
Patient is a 72y old  Female who presents with a chief complaint of Shortness of breath and Dizziness (20 Sep 2023 08:34)        HPI:  71 yo female with pmh of HLD, CAD, anemia, schizophrenia, PPM (indication not clear), active smoker now presents with shortness of breath and dizziness.     Pt is a poor historian. She says she's brought in here for weakness and low sodium. On further questioning, she revealed she has been having intermittent dizziness episodes and decreased capacity to walk for the past 5 days due to dyspnea. Denies exertional chest pain, fever, nausea, vomiting, diaphoresis, hemoptysis, orthopnea, PND, LE pain or swelling, recent immobilization or travel or other associated complaints at present. She has no bowel and bladder complaints.    ED vitals - afebrile, BP - 123/78mmhg; HR - 65 /min; SPo2 -100% on 3lpm  ECG - Ventricular paced rhythm @65/min; trop <0.01  Labs - BNP -13.3k; Na- 129; K - no reported ---> rpt sent; rest wnl  Chest x ray - b/l pleural effusion with vascular congestion and cardiomegaly     Pt admitted for CHF management.  (15 Sep 2023 22:50)      EP consulted for PPM interrogation. Device found to be at end of service. Gutierrez snot fu routinely with stone Hester ton remote monitoring.     REVIEW OF SYSTEMS    [x] A ten-point review of systems was otherwise negative except as noted.  [ ] Due to altered mental status/intubation, subjective information were not able to be obtained from the patient. History was obtained, to the extent possible, from review of the chart and collateral sources of information.      PAST MEDICAL & SURGICAL HISTORY:  Acute schizophrenia      CAD, multiple vessel      Acute MI      Abnormal cholesterol test      Absolute anemia      Artificial pacemaker          Home Medications:  amantadine 100 mg oral tablet: 1 tab(s) orally once a day (16 Sep 2023 00:01)  Amaryl 1 mg oral tablet: 1 tab(s) orally 2 times a day (16 Sep 2023 00:01)  Aspir 81 oral delayed release tablet: 1 tab(s) orally once a day (13 Jun 2018 09:03)  benztropine 0.5 mg oral tablet: 1 tab(s) orally once a day (16 Sep 2023 00:01)  ferrous fumarate 324 mg (106 mg elemental iron) oral tablet: 1 tab(s) orally once a day (16 Sep 2023 00:01)  fluPHENAZine enanthate 25 mg/mL injectable solution: 25 milligram(s) injectable 2 times a month (16 Sep 2023 00:01)  folic acid 1 mg oral tablet: 1 tab(s) orally once a day (13 Jun 2018 09:03)  glycopyrrolate 1 mg oral tablet: 1 tab(s) orally 2 times a day (16 Sep 2023 00:01)  Lipitor 10 mg oral tablet: 1 tab(s) orally once a day (13 Jun 2018 09:03)  melatonin 10 mg oral tablet: 1 tab(s) orally once a day (at bedtime) (16 Sep 2023 00:01)  metoprolol succinate 25 mg oral tablet, extended release: 1 tab(s) orally once a day (13 Jun 2018 09:03)  oxyBUTYnin 5 mg oral tablet: 1 tab(s) orally once a day (16 Sep 2023 00:01)  Prinivil 5 mg oral tablet: 1 tab(s) orally once a day (13 Jun 2018 09:03)  Protonix 40 mg oral delayed release tablet: 1 tab(s) orally 2 times a day (16 Sep 2023 00:01)  PROzac 10 mg oral tablet: 1 tab(s) orally once a day (13 Jun 2018 09:03)      Allergies:    eggs (Rash)  No Known Drug Allergies      FAMILY HISTORY:      SOCIAL HISTORY:  CIGARETTES: 1PPD  ALCOHOL: denies     MEDICATIONS  (STANDING):  amantadine 100 milliGRAM(s) Oral daily  aspirin enteric coated 81 milliGRAM(s) Oral daily  atorvastatin 10 milliGRAM(s) Oral at bedtime  benztropine 0.5 milliGRAM(s) Oral daily  cephalexin 500 milliGRAM(s) Oral every 12 hours  FLUoxetine 10 milliGRAM(s) Oral daily  folic acid 1 milliGRAM(s) Oral daily  furosemide    Tablet 40 milliGRAM(s) Oral daily  lisinopril 5 milliGRAM(s) Oral daily  magnesium sulfate  IVPB 2 Gram(s) IV Intermittent once  metoprolol succinate ER 25 milliGRAM(s) Oral daily  oxybutynin XL 5 milliGRAM(s) Oral daily  pantoprazole    Tablet 40 milliGRAM(s) Oral two times a day  potassium chloride  20 mEq/100 mL IVPB 20 milliEquivalent(s) IV Intermittent once  regadenoson Injectable 0.4 milliGRAM(s) IV Push once    MEDICATIONS  (PRN):  acetaminophen     Tablet .. 650 milliGRAM(s) Oral every 6 hours PRN Temp greater or equal to 38C (100.4F), Mild Pain (1 - 3)  aluminum hydroxide/magnesium hydroxide/simethicone Suspension 30 milliLiter(s) Oral every 4 hours PRN Dyspepsia  melatonin 3 milliGRAM(s) Oral at bedtime PRN Insomnia  ondansetron Injectable 4 milliGRAM(s) IV Push every 8 hours PRN Nausea and/or Vomiting      Vital Signs Last 24 Hrs  T(C): 36.6 (20 Sep 2023 04:32), Max: 36.7 (19 Sep 2023 14:06)  T(F): 97.8 (20 Sep 2023 04:32), Max: 98 (19 Sep 2023 14:06)  HR: 58 (20 Sep 2023 04:32) (58 - 66)  BP: 145/60 (20 Sep 2023 04:32) (107/57 - 145/60)  BP(mean): 98 (19 Sep 2023 10:51) (98 - 98)  RR: 18 (19 Sep 2023 20:31) (17 - 18)  SpO2: 94% (19 Sep 2023 20:31) (93% - 94%)    Parameters below as of 19 Sep 2023 10:51    O2 Flow (L/min): 2      PHYSICAL EXAM:    GENERAL: In no apparent distress, well nourished, and hydrated.  HEART: Regular rate and rhythm; No murmurs, rubs, or gallops.  PULMONARY: Clear to auscultation and perfusion.  No rales, wheezing, or rhonchi bilaterally.  ABDOMEN: Soft, Nontender, Nondistended; Bowel sounds present  EXTREMITIES:  2+ Peripheral Pulses, No clubbing, cyanosis, or edema  NEUROLOGICAL: AO x4, SHARMA, speech clear    INTERPRETATION OF TELEMETRY:  V paced    ECG:  < from: 12 Lead ECG (09.15.23 @ 17:35) >  Ventricular Rate 65 BPM    Atrial Rate 61 BPM    QRS Duration 188 ms    Q-T Interval 518 ms    QTC Calculation(Bazett) 538 ms    R Axis -56 degrees    T Axis 127 degrees    Diagnosis Line *** Poor data quality, interpretation may be adversely affected  Ventricular-paced rhythm  Abnormal ECG    < end of copied text >    I&O's Detail    19 Sep 2023 07:01  -  20 Sep 2023 07:00  --------------------------------------------------------  IN:  Total IN: 0 mL    OUT:    Voided (mL): 700 mL  Total OUT: 700 mL    Total NET: -700 mL          LABS:                        15.2   7.67  )-----------( 146      ( 20 Sep 2023 06:44 )             46.3     09-20    134<L>  |  91<L>  |  19  ----------------------------<  120<H>  3.3<L>   |  30  |  0.7    Ca    8.5      20 Sep 2023 06:44  Mg     1.6     09-20    TPro  7.0  /  Alb  3.4<L>  /  TBili  3.2<H>  /  DBili  x   /  AST  22  /  ALT  7   /  AlkPhos  120<H>  09-20      19 Sep 2023 07:01  -  20 Sep 2023 07:00  --------------------------------------------------------  IN:  Total IN: 0 mL    OUT:    Voided (mL): 700 mL  Total OUT: 700 mL    Total NET: -700 mL      RADIOLOGY:  < from: TTE Echo Complete w/ Contrast w/ Doppler (09.16.23 @ 10:14) >  Summary:   1. Left ventricular ejection fraction, by visual estimation, is 35 to   40%.   2. Technically difficult study.   3. Severely enlarged right atrium.   4. Abnormal septal motion consistent with left bundle branch block.   5.Spectral Doppler shows impaired relaxation pattern of left   ventricular myocardial filling (Grade I diastolic dysfunction).   6. Moderately enlarged right ventricle.   7. Mildly enlarged left atrium.   8. Degenerative mitral valve.   9. Mild mitral valve regurgitation.  10. Moderate thickening and calcification of the posterior mitral valve   leaflet.  11. Moderately decreased posterior mitral leaflet mobility.  12. Moderate-severe tricuspid regurgitation.  13. TR rafal: 2.09 m/s.  14. Sclerotic aortic valve with decreased opening.  15. Endocardial visualization was enhanced with intravenous echo contrast.    PHYSICIAN INTERPRETATION:  Left Ventricle: Endocardial visualization was enhanced with intravenous   echo contrast. Left ventricular ejection fraction, by visual estimation,   is 35 to 40%. Abnormal (paradoxical) septal motion, consistent with left   bundle branch block. Spectral Doppler shows impaired relaxation pattern   of left ventricular myocardial filling (Grade I diastolic dysfunction).  Right Ventricle: The right ventricle was not well visualized. The right   ventricular size is moderately enlarged.  Left Atrium: Mildly enlarged left atrium.  Right Atrium: Severely enlarged right atrium.  Mitral Valve: The mitral valve is degenerative in appearance. Moderate   thickening and calcification of the posterior mitral valve leaflet.   Mobility is moderately decreased for the posterior leaflet. Mild mitral   valve regurgitation is seen.  Tricuspid Valve: Structurally normal tricuspid valve, with normal leaflet   excursion. Moderate-severe tricuspid regurgitation is visualized. TR rafal:   2.09 m/s.  Aortic Valve: Sclerotic aortic valve with decreased opening.  Venous: The inferior vena cava is abnormal. The inferior vena cava was   dilated, with respiratory size variation less than 50%, consistent with   elevated right atrial pressure.    < end of copied text >

## 2023-09-18 NOTE — CONSULT NOTE ADULT - NS ATTEND AMEND GEN_ALL_CORE FT
Gen change vs ICD. D/w brother at length. TBD    Discussed risk/benefits of the procedure. Agreeable.

## 2023-09-18 NOTE — PROGRESS NOTE ADULT - SUBJECTIVE AND OBJECTIVE BOX
[place], [place]  72y Female    CHIEF COMPLAINT:    Patient is a 72y old  Female who presents with a chief complaint of Shortness of breath and Dizziness (18 Sep 2023 11:39)      INTERVAL HPI/OVERNIGHT EVENTS:    Patient seen and examined. Poor historian. Denies sob. On O2. No cp. No palpitations.     ROS: All other systems are negative.    Vital Signs:    T(F): 97.6 (23 @ 05:01), Max: 97.9 (23 @ 16:05)  HR: 73 (23 @ 05:01) (65 - 73)  BP: 146/66 (23 @ 05:01) (114/72 - 146/66)  RR: 18 (23 @ 10:41) (18 - )  SpO2: 95% (23 @ 10:41) (95% - 99%)  I&O's Summary    17 Sep 2023 07:  -  18 Sep 2023 07:00  --------------------------------------------------------  IN: 80 mL / OUT: 1250 mL / NET: -1170 mL    18 Sep 2023 07:01  -  18 Sep 2023 12:33  --------------------------------------------------------  IN: 0 mL / OUT: 1300 mL / NET: -1300 mL      Daily Height in cm: 160.02 (17 Sep 2023 22:27)    Daily Weight in k (18 Sep 2023 05:01)  CAPILLARY BLOOD GLUCOSE          PHYSICAL EXAM:    GENERAL:  NAD  SKIN: No rashes or lesions  HENT: Atraumatic. Normocephalic. PERRL. Moist membranes.  NECK: Supple, No JVD. No lymphadenopathy.  PULMONARY: CTA B/L. No wheezing. No rales  CVS: Normal S1, S2. Rate and Rhythm are regular. No murmurs.  ABDOMEN/GI: Soft, Nontender, Nondistended; BS present  EXTREMITIES: Peripheral pulses intact. No edema B/L LE.  NEUROLOGIC:  No motor or sensory deficit.  PSYCH: Alert & oriented x 3    Consultant(s) Notes Reviewed:  [x ] YES  [ ] NO  Care Discussed with Consultants/Other Providers [ x] YES  [ ] NO    EKG reviewed  Telemetry reviewed    LABS:                        14.8   9.64  )-----------( 101      ( 18 Sep 2023 06:52 )             45.2         138  |  93<L>  |  13  ----------------------------<  113<H>  4.6   |  33<H>  |  1.0    Ca    9.0      18 Sep 2023 06:52  Mg     1.7         TPro  7.6  /  Alb  3.7  /  TBili  1.9<H>  /  DBili  x   /  AST  42<H>  /  ALT  10  /  AlkPhos  138<H>          Trop <0.01, CKMB --, CK --, 23 @ 08:21  Trop <0.01, CKMB --, CK --, 23 @ 00:57  Trop <0.01, CKMB --, CK --, 09-15-23 @ 17:13        RADIOLOGY & ADDITIONAL TESTS:      Imaging or report Personally Reviewed:  [ x] YES  [ ] NO  < from: TTE Echo Complete w/ Contrast w/ Doppler (23 @ 10:14) >    Summary:   1. Left ventricular ejection fraction, by visual estimation, is 35 to   40%.   2. Technically difficult study.   3. Severely enlarged right atrium.   4. Abnormal septal motion consistent with left bundle branch block.   5.Spectral Doppler shows impaired relaxation pattern of left   ventricular myocardial filling (Grade I diastolic dysfunction).   6. Moderately enlarged right ventricle.   7. Mildly enlarged left atrium.   8. Degenerative mitral valve.   9. Mild mitral valve regurgitation.  10. Moderate thickening and calcification of the posterior mitral valve   leaflet.  11. Moderately decreased posterior mitral leaflet mobility.  12. Moderate-severe tricuspid regurgitation.  13. TR rafal: 2.09 m/s.  14. Sclerotic aortic valve with decreased opening.  15. Endocardial visualization was enhanced with intravenous echo contrast.    < end of copied text >  < from: TTE Echo Complete w/ Contrast w/ Doppler (23 @ 10:14) >  Venous: The inferior vena cava is abnormal. The inferior vena cava was   dilated, with respiratory size variation less than 50%, consistent with   elevated right atrial pressure.    < end of copied text >    Medications:  Standing  amantadine 100 milliGRAM(s) Oral daily  aspirin enteric coated 81 milliGRAM(s) Oral daily  atorvastatin 10 milliGRAM(s) Oral at bedtime  benztropine 0.5 milliGRAM(s) Oral daily  enoxaparin Injectable 40 milliGRAM(s) SubCutaneous every 24 hours  FLUoxetine 10 milliGRAM(s) Oral daily  folic acid 1 milliGRAM(s) Oral daily  furosemide   Injectable 40 milliGRAM(s) IV Push two times a day  lisinopril 5 milliGRAM(s) Oral daily  metoprolol succinate ER 25 milliGRAM(s) Oral daily  oxybutynin XL 5 milliGRAM(s) Oral daily  pantoprazole    Tablet 40 milliGRAM(s) Oral two times a day    PRN Meds  acetaminophen     Tablet .. 650 milliGRAM(s) Oral every 6 hours PRN  aluminum hydroxide/magnesium hydroxide/simethicone Suspension 30 milliLiter(s) Oral every 4 hours PRN  melatonin 3 milliGRAM(s) Oral at bedtime PRN  ondansetron Injectable 4 milliGRAM(s) IV Push every 8 hours PRN      Case discussed with resident    Care discussed with pt/family

## 2023-09-18 NOTE — SWALLOW BEDSIDE ASSESSMENT ADULT - SWALLOW EVAL: DIAGNOSIS
+toleration for soft and bite sized, minced+moist, and thin liquids w/o overt s/s aspiration/penetration.

## 2023-09-18 NOTE — SWALLOW BEDSIDE ASSESSMENT ADULT - SLP GENERAL OBSERVATIONS
pt received in bed awake alert eating breakfast w/o c/o pain. +3L NC +baseline productive congested cough

## 2023-09-18 NOTE — CONSULT NOTE ADULT - ASSESSMENT
EP: [place]    71 yo female with pmh of HLD, CAD, anemia, schizophrenia, PPM (indication not clear), active smoker now presents with shortness of breath and dizziness. She has been having intermittent dizziness and GRIFFIN for the past 5 days. Interrogation of PPM revealed device at [facility] (RRT in March)    Impression:  PPM at [facility] ([employer])  Acute HFrEF  CAD / DL  Schizophrenia  Hyponatremia  CKD-3  Tobacco use    Plan:  - Pt will require gen change, plan discussed with patient and her brother  - Please obtain records from [facility] including most recent echo and any cardiac work up  - Tele while admitted  - Will follow

## 2023-09-18 NOTE — PROGRESS NOTE ADULT - ASSESSMENT
71 yo female with pmh of HLD, CAD, anemia, schizophrenia, PPM (indication not clear), active smoker now presents with shortness of breath and dizziness. She has been having intermittent dizziness and GRIFFIN for the past 5 days.     Acute HFrEF  CAD / DL  Schizophrenia  Hyponatremia  CKD-3  Tobacco use             PLAN:    ·	Tele reviewed. No events  ·	CE x 3 are negative  ·	ECHO reviewed. EF is 35-40%. Moderate to severe TR. Dilated IVC. Respiratory size variation <50%  ·	O/E pt is euvolemic. Switch her to Lasix 40 mg po daily  ·	Check i's and o's and daily wt.   ·	Low salt diet and water restriction to 1.5 L/D  ·	Monitor electrolytes and renal function.  ·	Cardiology f/u for ischemia w/u  ·	On GDMT. Cont Lisinopril and Metoprolol Succinate  ·	Cont her other home meds.     Progress Note Handoff    Pending (specify):  Consults_Cardiology f/u________, Tests________, Test Results_______, Other_________  Family discussion:  Disposition: Home___/SNF___/Other________/Unknown at this time________    Chucky Mckinley MD  Spectra: [id number]

## 2023-09-18 NOTE — PROGRESS NOTE ADULT - SUBJECTIVE AND OBJECTIVE BOX
[place], [place]  72y Female    CHIEF COMPLAINT:    Patient is a 72y old  Female who presents with a chief complaint of Shortness of breath and Dizziness (18 Sep 2023 07:31)      INTERVAL HPI/OVERNIGHT EVENTS:    Patient seen and examined.    ROS: All other systems are negative.    Vital Signs:    T(F): 97.6 (23 @ 05:01), Max: 98.2 (23 @ 09:22)  HR: 73 (23 @ 05:01) (65 - 73)  BP: 146/66 (23 @ 05:01) (114/72 - 146/66)  RR: 18 (23 @ 05:01) (18 - 18)  SpO2: 98% (23 @ 22:27) (96% - 99%)  I&O's Summary    17 Sep 2023 07:01  -  18 Sep 2023 07:00  --------------------------------------------------------  IN: 80 mL / OUT: 1250 mL / NET: -1170 mL      Daily Height in cm: 160.02 (17 Sep 2023 22:27)    Daily Weight in k (18 Sep 2023 05:01)  CAPILLARY BLOOD GLUCOSE          PHYSICAL EXAM:    GENERAL:  NAD  SKIN: No rashes or lesions  HENT: Atraumatic. Normocephalic. PERRL. Moist membranes.  NECK: Supple, No JVD. No lymphadenopathy.  PULMONARY: CTA B/L. No wheezing. No rales  CVS: Normal S1, S2. Rate and Rhythm are regular. No murmurs.  ABDOMEN/GI: Soft, Nontender, Nondistended; BS present  EXTREMITIES: Peripheral pulses intact. No edema B/L LE.  NEUROLOGIC:  No motor or sensory deficit.  PSYCH: Alert & oriented x 3    Consultant(s) Notes Reviewed:  [x ] YES  [ ] NO  Care Discussed with Consultants/Other Providers [ x] YES  [ ] NO    EKG reviewed  Telemetry reviewed    LABS:                        15.2   5.26  )-----------( 146      ( 17 Sep 2023 07:52 )             47.0         139  |  100  |  13  ----------------------------<  130<H>  5.1<H>   |  30  |  1.1    Ca    8.8      17 Sep 2023 07:52  Mg     2.1         TPro  7.5  /  Alb  3.6  /  TBili  1.8<H>  /  DBili  x   /  AST  35  /  ALT  8   /  AlkPhos  140<H>          Trop <0.01, CKMB --, CK --, 23 @ 08:21  Trop <0.01, CKMB --, CK --, 23 @ 00:57  Trop <0.01, CKMB --, CK --, 09-15-23 @ 17:13        RADIOLOGY & ADDITIONAL TESTS:    < from: TTE Echo Complete w/ Contrast w/ Doppler (23 @ 10:14) >    Summary:   1. Left ventricular ejection fraction, by visual estimation, is 35 to   40%.   2. Technically difficult study.   3. Severely enlarged right atrium.   4. Abnormal septal motion consistent with left bundle branch block.   5.Spectral Doppler shows impaired relaxation pattern of left   ventricular myocardial filling (Grade I diastolic dysfunction).   6. Moderately enlarged right ventricle.   7. Mildly enlarged left atrium.   8. Degenerative mitral valve.   9. Mild mitral valve regurgitation.  10. Moderate thickening and calcification of the posterior mitral valve   leaflet.  11. Moderately decreased posterior mitral leaflet mobility.  12. Moderate-severe tricuspid regurgitation.  13. TR rafal: 2.09 m/s.  14. Sclerotic aortic valve with decreased opening.  15. Endocardial visualization was enhanced with intravenous echo contrast.    < end of copied text >  < from: TTE Echo Complete w/ Contrast w/ Doppler (23 @ 10:14) >  Venous: The inferior vena cava is abnormal. The inferior vena cava was   dilated, with respiratory size variation less than 50%, consistent with   elevated right atrial pressure.    < end of copied text >  < from: Xray Chest 1 View AP/PA (09.15.23 @ 17:09) >  Impression:    Enlarged cardiac silhouette and bilateral interstitial opacities which   may reflect interstitial edema.  Small bilateral pleural effusions.    < end of copied text >    Imaging or report Personally Reviewed:  [x ] YES  [ ] NO    Medications:  Standing  amantadine 100 milliGRAM(s) Oral daily  aspirin enteric coated 81 milliGRAM(s) Oral daily  atorvastatin 10 milliGRAM(s) Oral at bedtime  benztropine 0.5 milliGRAM(s) Oral daily  enoxaparin Injectable 40 milliGRAM(s) SubCutaneous every 24 hours  FLUoxetine 10 milliGRAM(s) Oral daily  folic acid 1 milliGRAM(s) Oral daily  furosemide   Injectable 40 milliGRAM(s) IV Push two times a day  lisinopril 5 milliGRAM(s) Oral daily  metoprolol succinate ER 25 milliGRAM(s) Oral daily  oxybutynin XL 5 milliGRAM(s) Oral daily  pantoprazole    Tablet 40 milliGRAM(s) Oral two times a day    PRN Meds  acetaminophen     Tablet .. 650 milliGRAM(s) Oral every 6 hours PRN  aluminum hydroxide/magnesium hydroxide/simethicone Suspension 30 milliLiter(s) Oral every 4 hours PRN  melatonin 3 milliGRAM(s) Oral at bedtime PRN  ondansetron Injectable 4 milliGRAM(s) IV Push every 8 hours PRN      Case discussed with resident    Care discussed with pt/family

## 2023-09-18 NOTE — PROGRESS NOTE ADULT - ASSESSMENT
71 yo female with pmh of HLD, CAD, anemia, schizophrenia, PPM (indication not clear), active smoker now presents with shortness of breath and dizziness. She has been having intermittent dizziness and GRIFFIN for the past 5 days.     Acute HFrEF  CAD / DL  Schizophrenia  Hyponatremia  CKD-3  Tobacco use             PLAN:

## 2023-09-18 NOTE — SWALLOW BEDSIDE ASSESSMENT ADULT - SLP PERTINENT HISTORY OF CURRENT PROBLEM
Pt is a 73 y/o f w/ PMHx: HLD, CAD, PPM, schizophrenia and active smoker now p/w SOB. Pt is being treated for HFrEF, lactic acidemia, severe TR regurg. ?COPD. CXR-> B/L interstitial opacities which may reflect interstitial edema.
73 yo female with pmh of HLD, CAD, anemia, schizophrenia, PPM (indication not clear), active smoker now presents with shortness of breath and dizziness. Pt is a poor historian. She says she's brought in here for weakness and low sodium. On further questioning, she revealed she has been having intermittent dizziness episodes and decreased capacity to walk for the past 5 days due to dyspnea. Denies exertional chest pain, fever, nausea, vomiting, diaphoresis, hemoptysis, orthopnea, PND, LE pain or swelling, recent immobilization or travel or other associated complaints at present. She has no bowel and bladder complaints.

## 2023-09-18 NOTE — PROGRESS NOTE ADULT - SUBJECTIVE AND OBJECTIVE BOX
24H events:    Patient is a 72y old Female who presents with a chief complaint of Shortness of breath and Dizziness (18 Sep 2023 07:31)    Primary diagnosis of Acute CHF      Today is 3d of hospitalization. This morning patient was seen and examined at bedside, resting comfortably in bed.    No acute or major events overnight. Patient denies fevers, chills, headache, chest pain, dyspnea, cough, nausea, vomiting, abdominal pain or BM changes.   Pt says that the indication for pacemaker was a heart attack 10 years ago. Does not know the company.    Code Status:    Family communication:  Contact date:  Name of person contacted:  Relationship to patient:  Communication details:  What matters most:    PAST MEDICAL & SURGICAL HISTORY  Acute schizophrenia    CAD, multiple vessel    Acute MI    Abnormal cholesterol test    Absolute anemia    Artificial pacemaker      SOCIAL HISTORY:  Social History:      ALLERGIES:  eggs (Rash)  No Known Drug Allergies    MEDICATIONS:  STANDING MEDICATIONS  amantadine 100 milliGRAM(s) Oral daily  aspirin enteric coated 81 milliGRAM(s) Oral daily  atorvastatin 10 milliGRAM(s) Oral at bedtime  benztropine 0.5 milliGRAM(s) Oral daily  enoxaparin Injectable 40 milliGRAM(s) SubCutaneous every 24 hours  FLUoxetine 10 milliGRAM(s) Oral daily  folic acid 1 milliGRAM(s) Oral daily  furosemide   Injectable 40 milliGRAM(s) IV Push two times a day  lisinopril 5 milliGRAM(s) Oral daily  magnesium sulfate  IVPB 2 Gram(s) IV Intermittent once  metoprolol succinate ER 25 milliGRAM(s) Oral daily  oxybutynin XL 5 milliGRAM(s) Oral daily  pantoprazole    Tablet 40 milliGRAM(s) Oral two times a day    PRN MEDICATIONS  acetaminophen     Tablet .. 650 milliGRAM(s) Oral every 6 hours PRN  aluminum hydroxide/magnesium hydroxide/simethicone Suspension 30 milliLiter(s) Oral every 4 hours PRN  melatonin 3 milliGRAM(s) Oral at bedtime PRN  ondansetron Injectable 4 milliGRAM(s) IV Push every 8 hours PRN    VITALS:   T(F): 97.6  HR: 73  BP: 146/66  RR: 18  SpO2: 95%    PHYSICAL EXAM:  GENERAL:   ( x) NAD, lying in bed comfortably     (  ) obtunded     (  ) lethargic     (  ) somnolent    HEAD:   ( x) Atraumatic     (  ) hematoma     (  ) laceration (specify location:       )     NECK:  (x) Supple     (  ) neck stiffness     (  ) nuchal rigidity     (  )  no JVD     (  ) JVD present ( -- cm)    HEART:  Rate -->     (x) normal rate     (  ) bradycardic     (  ) tachycardic  Rhythm -->     (x) regular     (  ) regularly irregular     (  ) irregularly irregular  Murmurs -->     (x) normal s1s2     (  ) systolic murmur     (  ) diastolic murmur     (  ) continuous murmur      (  ) S3 present     (  ) S4 present    LUNGS:   ( x)Unlabored respirations     (  ) tachypnea  ( x) B/L air entry     (  ) decreased breath sounds in:  (location     )    ( ) no adventitious sound     ( X ) crackles     (  ) wheezing      (  ) rhonchi      (specify location:       )  (  ) chest wall tenderness (specify location:       )    ABDOMEN:   ( x) Soft     (  ) tense   |   (X  ) nondistended     (  ) distended   |   ( X ) +BS     (  ) hypoactive bowel sounds     (  ) hyperactive bowel sounds  ( x) nontender     (  ) RUQ tenderness     (  ) RLQ tenderness     (  ) LLQ tenderness     (  ) epigastric tenderness     (  ) diffuse tenderness  (  ) Splenomegaly      (  ) Hepatomegaly      (  ) Jaundice     (  ) ecchymosis     EXTREMITIES:  ( ) Normal     (  ) Rash     (  ) ecchymosis     (  ) varicose veins      (  ) pitting edema     ( X ) non-pitting edema   (  ) ulceration     (  ) gangrene:     (location:     )    NERVOUS SYSTEM:    ( x) A&Ox3     (  ) confused     (  ) lethargic  CN II-XII:     (  ) Intact     (  ) deficits found     (Specify:     )   Upper extremities:     (  ) no sensorimotor deficits     (  ) weakness     (  ) loss of proprioception/vibration     (  ) loss of touch/temperature (specify:    )  Lower extremities:     (  ) no sensorimotor deficits     (  ) weakness     (  ) loss of proprioception/vibration     (  ) loss of touch/temperature (specify:    )    SKIN:   ( X ) No rashes or lesions     (  ) maculopapular rash     (  ) pustules     (  ) vesicles     (  ) ulcer     (  ) ecchymosis     (specify location:     )        LABS:                        14.8   9.64  )-----------( 101      ( 18 Sep 2023 06:52 )             45.2     09-18    138  |  93<L>  |  13  ----------------------------<  113<H>  4.6   |  33<H>  |  1.0    Ca    9.0      18 Sep 2023 06:52  Mg     1.7     09-18    TPro  7.6  /  Alb  3.7  /  TBili  1.9<H>  /  DBili  x   /  AST  42<H>  /  ALT  10  /  AlkPhos  138<H>  09-18      Urinalysis Basic - ( 18 Sep 2023 06:52 )    Color: x / Appearance: x / SG: x / pH: x  Gluc: 113 mg/dL / Ketone: x  / Bili: x / Urobili: x   Blood: x / Protein: x / Nitrite: x   Leuk Esterase: x / RBC: x / WBC x   Sq Epi: x / Non Sq Epi: x / Bacteria: x        Lactate, Blood: 1.7 mmol/L (09-18-23 @ 06:52)     24H events:    Patient is a 72y old Female who presents with a chief complaint of Shortness of breath and Dizziness (18 Sep 2023 07:31)    Primary diagnosis of Acute CHF      Today is 3d of hospitalization. This morning patient was seen and examined at bedside, resting comfortably in bed.    No acute or major events overnight. Patient denies fevers, chills, headache, chest pain, dyspnea, nausea, vomiting, abdominal pain or BM changes.   Pt says that the indication for pacemaker was a heart attack 10 years ago. Does not know the company. Endorses cough.    Code Status:    Family communication:  Contact date:  Name of person contacted:  Relationship to patient:  Communication details:  What matters most:    PAST MEDICAL & SURGICAL HISTORY  Acute schizophrenia    CAD, multiple vessel    Acute MI    Abnormal cholesterol test    Absolute anemia    Artificial pacemaker      SOCIAL HISTORY:  Social History:      ALLERGIES:  eggs (Rash)  No Known Drug Allergies    MEDICATIONS:  STANDING MEDICATIONS  amantadine 100 milliGRAM(s) Oral daily  aspirin enteric coated 81 milliGRAM(s) Oral daily  atorvastatin 10 milliGRAM(s) Oral at bedtime  benztropine 0.5 milliGRAM(s) Oral daily  enoxaparin Injectable 40 milliGRAM(s) SubCutaneous every 24 hours  FLUoxetine 10 milliGRAM(s) Oral daily  folic acid 1 milliGRAM(s) Oral daily  furosemide   Injectable 40 milliGRAM(s) IV Push two times a day  lisinopril 5 milliGRAM(s) Oral daily  magnesium sulfate  IVPB 2 Gram(s) IV Intermittent once  metoprolol succinate ER 25 milliGRAM(s) Oral daily  oxybutynin XL 5 milliGRAM(s) Oral daily  pantoprazole    Tablet 40 milliGRAM(s) Oral two times a day    PRN MEDICATIONS  acetaminophen     Tablet .. 650 milliGRAM(s) Oral every 6 hours PRN  aluminum hydroxide/magnesium hydroxide/simethicone Suspension 30 milliLiter(s) Oral every 4 hours PRN  melatonin 3 milliGRAM(s) Oral at bedtime PRN  ondansetron Injectable 4 milliGRAM(s) IV Push every 8 hours PRN    VITALS:   T(F): 97.6  HR: 73  BP: 146/66  RR: 18  SpO2: 95%    PHYSICAL EXAM:  GENERAL:   ( x) NAD, lying in bed comfortably     (  ) obtunded     (  ) lethargic     (  ) somnolent    HEAD:   ( x) Atraumatic     (  ) hematoma     (  ) laceration (specify location:       )     NECK:  (x) Supple     (  ) neck stiffness     (  ) nuchal rigidity     (  )  no JVD     (  ) JVD present ( -- cm)    HEART:  Rate -->     (x) normal rate     (  ) bradycardic     (  ) tachycardic  Rhythm -->     (x) regular     (  ) regularly irregular     (  ) irregularly irregular  Murmurs -->     (x) normal s1s2     (  ) systolic murmur     (  ) diastolic murmur     (  ) continuous murmur      (  ) S3 present     (  ) S4 present    LUNGS:  Dry cough noted  ( x)Unlabored respirations     (  ) tachypnea  ( x) B/L air entry     (  ) decreased breath sounds in:  (location     )    ( ) no adventitious sound     ( X ) crackles     (  ) wheezing      (  ) rhonchi      (specify location:       )  (  ) chest wall tenderness (specify location:       )    ABDOMEN:   ( x) Soft     (  ) tense   |   (X  ) nondistended     (  ) distended   |   ( X ) +BS     (  ) hypoactive bowel sounds     (  ) hyperactive bowel sounds  ( x) nontender     (  ) RUQ tenderness     (  ) RLQ tenderness     (  ) LLQ tenderness     (  ) epigastric tenderness     (  ) diffuse tenderness  (  ) Splenomegaly      (  ) Hepatomegaly      (  ) Jaundice     (  ) ecchymosis     EXTREMITIES:  ( ) Normal     (  ) Rash     (  ) ecchymosis     (  ) varicose veins      (  ) pitting edema     ( X ) non-pitting edema   (  ) ulceration     (  ) gangrene:     (location:     )    NERVOUS SYSTEM:    ( x) A&Ox3     (  ) confused     (  ) lethargic  CN II-XII:     (  ) Intact     (  ) deficits found     (Specify:     )   Upper extremities:     (  ) no sensorimotor deficits     (  ) weakness     (  ) loss of proprioception/vibration     (  ) loss of touch/temperature (specify:    )  Lower extremities:     (  ) no sensorimotor deficits     (  ) weakness     (  ) loss of proprioception/vibration     (  ) loss of touch/temperature (specify:    )    SKIN:   ( X ) No rashes or lesions     (  ) maculopapular rash     (  ) pustules     (  ) vesicles     (  ) ulcer     (  ) ecchymosis     (specify location:     )        LABS:                        14.8   9.64  )-----------( 101      ( 18 Sep 2023 06:52 )             45.2     09-18    138  |  93<L>  |  13  ----------------------------<  113<H>  4.6   |  33<H>  |  1.0    Ca    9.0      18 Sep 2023 06:52  Mg     1.7     09-18    TPro  7.6  /  Alb  3.7  /  TBili  1.9<H>  /  DBili  x   /  AST  42<H>  /  ALT  10  /  AlkPhos  138<H>  09-18      Urinalysis Basic - ( 18 Sep 2023 06:52 )    Color: x / Appearance: x / SG: x / pH: x  Gluc: 113 mg/dL / Ketone: x  / Bili: x / Urobili: x   Blood: x / Protein: x / Nitrite: x   Leuk Esterase: x / RBC: x / WBC x   Sq Epi: x / Non Sq Epi: x / Bacteria: x        Lactate, Blood: 1.7 mmol/L (09-18-23 @ 06:52)

## 2023-09-19 NOTE — PROGRESS NOTE ADULT - ASSESSMENT
73 yo female with pmh of HLD, CAD, anemia, schizophrenia, PPM (indication not clear), active smoker now presents with shortness of breath and dizziness. She has been having intermittent dizziness and GRIFFIN for the past 5 days.     A/P:  #Acute HFrEF- EF 30-35%  #CAD   #h/o PPM - unclear indication but now with end of battery life  #Active tobacco use  -continue tele monitor- CE x 3 negative  -ECHO- EF 35-40%, mod-severe TR, dilated IVC,   -EPS following  -pod#0 from PPM generator replacement today (9/19)  -monitor i/o, daily weights and fluid restriction/diet modification (low salt)  -f/u with cardiology for ischemia w/u (consider inpatient NST)  -smoking cessation counseling  -continue lasix 40 mg oral daily---monitor fluid status (currently euvolemic)  -continue GDMT- lisinopril and metoprolol  -monitor bp closely  -continue current management of chronic med issues    #hyperbilirubinemia  -tbil trending up to 2.3  -cont to trend lft- repeat in pm  -check RUQ US  -hold nephrotoxic meds    #DVT/GI ppx  guarded prognosis    FULL CODE    lives at [facility] but may need STR on dc- case managment aware    Total time spent to complete patient's bedside assessment, review medical chart, discuss medical plan of care with covering medical team was more than 50 minutes  with >50% of time spendt face to face with patient, discussion with patient/family and/or coordination of care   71 yo female with pmh of HLD, CAD, anemia, schizophrenia, PPM (indication not clear), active smoker now presents with shortness of breath and dizziness. She has been having intermittent dizziness and GRIFFIN for the past 5 days.     A/P:  #Acute HFrEF- EF 30-35%  #CAD   #h/o PPM - unclear indication but now with end of battery life  #Active tobacco use  -continue tele monitor- CE x 3 negative  -ECHO- EF 35-40%, mod-severe TR, dilated IVC,   -EPS f/u   -pod#0 from PPM generator replacement today (9/19)  -monitor i/o, daily weights and fluid restriction/diet modification (low salt)  -f/u with cardiology for ischemia w/u (consider inpatient NST)  -smoking cessation counseling  -continue lasix 40 mg oral daily---monitor fluid status (currently euvolemic)  -continue GDMT- lisinopril and metoprolol  -monitor bp closely  -continue current management of chronic med issues    #hyperbilirubinemia  -tbil trending up to 2.3  -cont to trend lft- repeat in pm  -check RUQ US  -hold nephrotoxic meds    #DVT/GI ppx  guarded prognosis    FULL CODE    lives at [facility] but may need STR on dc- case managment aware    Total time spent to complete patient's bedside assessment, review medical chart, discuss medical plan of care with covering medical team was more than 50 minutes  with >50% of time spendt face to face with patient, discussion with patient/family and/or coordination of care

## 2023-09-19 NOTE — PROGRESS NOTE ADULT - SUBJECTIVE AND OBJECTIVE BOX
24H events:    Patient is a 72y old Female who presents with a chief complaint of Shortness of breath and Dizziness (18 Sep 2023 12:33)    Primary diagnosis of Acute CHF    Today is 4d of hospitalization. This morning patient was seen and examined at bedside, resting comfortably in bed.    No acute or major events overnight. Patient denies fevers, chills, headache, chest pain, dyspnea, cough, nausea, vomiting, abdominal pain or BM changes.   Pt is NPO for for EP pacemaker battery replacement.    Code Status:    Family communication:  Contact date:  Name of person contacted:  Relationship to patient:  Communication details:  What matters most:    PAST MEDICAL & SURGICAL HISTORY  Acute schizophrenia    CAD, multiple vessel    Acute MI    Abnormal cholesterol test    Absolute anemia    Artificial pacemaker      SOCIAL HISTORY:  Social History:      ALLERGIES:  eggs (Rash)  No Known Drug Allergies    MEDICATIONS:  STANDING MEDICATIONS  amantadine 100 milliGRAM(s) Oral daily  aspirin enteric coated 81 milliGRAM(s) Oral daily  atorvastatin 10 milliGRAM(s) Oral at bedtime  benztropine 0.5 milliGRAM(s) Oral daily  FLUoxetine 10 milliGRAM(s) Oral daily  folic acid 1 milliGRAM(s) Oral daily  furosemide    Tablet 40 milliGRAM(s) Oral daily  lisinopril 5 milliGRAM(s) Oral daily  metoprolol succinate ER 25 milliGRAM(s) Oral daily  oxybutynin XL 5 milliGRAM(s) Oral daily  pantoprazole    Tablet 40 milliGRAM(s) Oral two times a day  vancomycin  IVPB 1000 milliGRAM(s) IV Intermittent once  vancomycin  IVPB 1000 milliGRAM(s) IV Intermittent once    PRN MEDICATIONS  acetaminophen     Tablet .. 650 milliGRAM(s) Oral every 6 hours PRN  aluminum hydroxide/magnesium hydroxide/simethicone Suspension 30 milliLiter(s) Oral every 4 hours PRN  melatonin 3 milliGRAM(s) Oral at bedtime PRN  ondansetron Injectable 4 milliGRAM(s) IV Push every 8 hours PRN    VITALS:   T(F): 97.9  HR: 64  BP: 133/61  RR: 17  SpO2: 93%    PHYSICAL EXAM:  GENERAL:   ( x) NAD, lying in bed comfortably     (  ) obtunded     (  ) lethargic     (  ) somnolent    HEAD:   ( x) Atraumatic     (  ) hematoma     (  ) laceration (specify location:       )     NECK:  (x) Supple     (  ) neck stiffness     (  ) nuchal rigidity     (  )  no JVD     (  ) JVD present ( -- cm)    HEART:  Rate -->     (x) normal rate     (  ) bradycardic     (  ) tachycardic  Rhythm -->     (x) regular     (  ) regularly irregular     (  ) irregularly irregular  Murmurs -->     (x) normal s1s2     (  ) systolic murmur     (  ) diastolic murmur     (  ) continuous murmur      (  ) S3 present     (  ) S4 present    LUNGS:   ( x)Unlabored respirations     (  ) tachypnea  ( x) B/L air entry     (  ) decreased breath sounds in:  (location     )    ( ) no adventitious sound     (X  ) crackles     (  ) wheezing      (  ) rhonchi      (specify location:       )  (  ) chest wall tenderness (specify location:       )    ABDOMEN:   ( x) Soft     (  ) tense   |   (X  ) nondistended     (  ) distended   |   ( X ) +BS     (  ) hypoactive bowel sounds     (  ) hyperactive bowel sounds  ( x) nontender     (  ) RUQ tenderness     (  ) RLQ tenderness     (  ) LLQ tenderness     (  ) epigastric tenderness     (  ) diffuse tenderness  (  ) Splenomegaly      (  ) Hepatomegaly      (  ) Jaundice     (  ) ecchymosis     EXTREMITIES:  ( x) Normal     (  ) Rash     (  ) ecchymosis     (  ) varicose veins      (  ) pitting edema     (  ) non-pitting edema   (  ) ulceration     (  ) gangrene:     (location:     )    NERVOUS SYSTEM:    ( x) A&Ox3     (  ) confused     (  ) lethargic  CN II-XII:     (  ) Intact     (  ) deficits found     (Specify:     )   Upper extremities:     (  ) no sensorimotor deficits     (  ) weakness     (  ) loss of proprioception/vibration     (  ) loss of touch/temperature (specify:    )  Lower extremities:     (  ) no sensorimotor deficits     (  ) weakness     (  ) loss of proprioception/vibration     (  ) loss of touch/temperature (specify:    )    SKIN:   ( X ) No rashes or lesions     (  ) maculopapular rash     (  ) pustules     (  ) vesicles     (  ) ulcer     (  ) ecchymosis     (specify location:     )      LABS:                        15.9   8.34  )-----------( 169      ( 19 Sep 2023 06:18 )             49.0     09-19    135  |  90<L>  |  17  ----------------------------<  132<H>  3.4<L>   |  33<H>  |  0.9    Ca    8.7      19 Sep 2023 06:18  Mg     1.9     09-19    TPro  7.5  /  Alb  3.5  /  TBili  2.3<H>  /  DBili  x   /  AST  27  /  ALT  10  /  AlkPhos  133<H>  09-19      Urinalysis Basic - ( 19 Sep 2023 06:18 )    Color: x / Appearance: x / SG: x / pH: x  Gluc: 132 mg/dL / Ketone: x  / Bili: x / Urobili: x   Blood: x / Protein: x / Nitrite: x   Leuk Esterase: x / RBC: x / WBC x   Sq Epi: x / Non Sq Epi: x / Bacteria: x

## 2023-09-19 NOTE — PROGRESS NOTE ADULT - NS ATTEND AMEND GEN_ALL_CORE FT
Reviewed Records from [facility]  It is still unclear if NICM is new or old  Will proceed with Gen change and reassess EF at a later point of time  Nuclear stress test  Gen change today

## 2023-09-19 NOTE — PROGRESS NOTE ADULT - ASSESSMENT
EP: [facility]    73 yo female with pmh of HLD, CAD, anemia, schizophrenia, PPM (indication not clear), active smoker now presents with shortness of breath and dizziness. She has been having intermittent dizziness and GRIFFIN for the past 5 days. Interrogation of PPM revealed device at [facility] (RRT in March). Records from [facility] reviewed, no echo reported and unknown previous EF. Will plan for battery change today    Impression:  PPM at [facility] ([employer])  Acute HFrEF  CAD / DL  Schizophrenia  Hyponatremia  CKD-3  Tobacco use    Plan:  - Keep NPO for battery change today  - Cont tele monitoring  - Monitor electrolytes, maintain WNL  - Will follow

## 2023-09-19 NOTE — PROGRESS NOTE ADULT - SUBJECTIVE AND OBJECTIVE BOX
INTERVAL HPI/OVERNIGHT EVENTS:  No acute events overnight    MEDICATIONS  (STANDING):  amantadine 100 milliGRAM(s) Oral daily  aspirin enteric coated 81 milliGRAM(s) Oral daily  atorvastatin 10 milliGRAM(s) Oral at bedtime  benztropine 0.5 milliGRAM(s) Oral daily  cephalexin 500 milliGRAM(s) Oral every 12 hours  FLUoxetine 10 milliGRAM(s) Oral daily  folic acid 1 milliGRAM(s) Oral daily  furosemide    Tablet 40 milliGRAM(s) Oral daily  lisinopril 5 milliGRAM(s) Oral daily  metoprolol succinate ER 25 milliGRAM(s) Oral daily  oxybutynin XL 5 milliGRAM(s) Oral daily  pantoprazole    Tablet 40 milliGRAM(s) Oral two times a day  regadenoson Injectable 0.4 milliGRAM(s) IV Push once    MEDICATIONS  (PRN):  acetaminophen     Tablet .. 650 milliGRAM(s) Oral every 6 hours PRN Temp greater or equal to 38C (100.4F), Mild Pain (1 - 3)  aluminum hydroxide/magnesium hydroxide/simethicone Suspension 30 milliLiter(s) Oral every 4 hours PRN Dyspepsia  melatonin 3 milliGRAM(s) Oral at bedtime PRN Insomnia  ondansetron Injectable 4 milliGRAM(s) IV Push every 8 hours PRN Nausea and/or Vomiting      Allergies    eggs (Rash)  No Known Drug Allergies    Intolerances        REVIEW OF SYSTEMS: Occasional lightheadedness, No CP or palpitations. No LOC    Vital Signs Last 24 Hrs  T(C): 36.6 (20 Sep 2023 04:32), Max: 36.7 (19 Sep 2023 14:06)  T(F): 97.8 (20 Sep 2023 04:32), Max: 98 (19 Sep 2023 14:06)  HR: 58 (20 Sep 2023 04:32) (58 - 66)  BP: 145/60 (20 Sep 2023 04:32) (107/57 - 145/60)  BP(mean): --  RR: 18 (19 Sep 2023 20:31) (18 - 18)  SpO2: 94% (19 Sep 2023 20:31) (94% - 94%)        09-19-23 @ 07:01  -  09-20-23 @ 07:00  --------------------------------------------------------  IN: 0 mL / OUT: 700 mL / NET: -700 mL        Physical Exam  GENERAL: In no apparent distress, well nourished, and hydrated.  EYES: EOMI, PERRLA, conjunctiva and sclera clear  NECK: Supple  HEART: Regular rate and rhythm; No murmurs, rubs, or gallops.  PULMONARY: Clear to auscultation and perfusion.  No rales, wheezing, or rhonchi bilaterally.  EXTREMITIES:  2+ Peripheral Pulses, No clubbing, cyanosis, or edema  NEUROLOGICAL: Grossly nonfocal    LABS:                        15.2   7.67  )-----------( 146      ( 20 Sep 2023 06:44 )             46.3     09-20    134<L>  |  91<L>  |  19  ----------------------------<  120<H>  3.3<L>   |  30  |  0.7    Ca    8.5      20 Sep 2023 06:44  Mg     1.6     09-20    TPro  7.0  /  Alb  3.4<L>  /  TBili  3.2<H>  /  DBili  x   /  AST  22  /  ALT  7   /  AlkPhos  120<H>  09-20      Urinalysis Basic - ( 20 Sep 2023 06:44 )    Color: x / Appearance: x / SG: x / pH: x  Gluc: 120 mg/dL / Ketone: x  / Bili: x / Urobili: x   Blood: x / Protein: x / Nitrite: x   Leuk Esterase: x / RBC: x / WBC x   Sq Epi: x / Non Sq Epi: x / Bacteria: x        RADIOLOGY & ADDITIONAL TESTS:

## 2023-09-19 NOTE — PROGRESS NOTE ADULT - SUBJECTIVE AND OBJECTIVE BOX
Patient is a 72y old  Female who presents with a chief complaint of Shortness of breath and Dizziness (19 Sep 2023 11:35)    HPI:  73 yo female with pmh of HLD, CAD, anemia, schizophrenia, PPM (indication not clear), active smoker now presents with shortness of breath and dizziness.     Pt is a poor historian. She says she's brought in here for weakness and low sodium. On further questioning, she revealed she has been having intermittent dizziness episodes and decreased capacity to walk for the past 5 days due to dyspnea. Denies exertional chest pain, fever, nausea, vomiting, diaphoresis, hemoptysis, orthopnea, PND, LE pain or swelling, recent immobilization or travel or other associated complaints at present. She has no bowel and bladder complaints.    ED vitals - afebrile, BP - 123/78mmhg; HR - 65 /min; SPo2 -100% on 3lpm  ECG - Ventricular paced rhythm @65/min; trop <0.01  Labs - BNP -13.3k; Na- 129; K - no reported ---> rpt sent; rest wnl  Chest x ray - b/l pleural effusion with vascular congestion and cardiomegaly     Pt admitted for CHF management.  (15 Sep 2023 22:50)    PAST MEDICAL & SURGICAL HISTORY:  Acute schizophrenia  CAD, multiple vessel  Acute MI  Abnormal cholesterol test  Absolute anemia  Artificial pacemaker    patient seen and examined independently after returning from OR for the first time today, chart reviewed and discussed with the medicine resident and on interdisciplinary rounds    hospital day #4    no overnight events---team attempting to obtain med chart from [facility] ([facility] for most recent echo results---      Vital Signs Last 24 Hrs  T(C): 36.7 (19 Sep 2023 14:06), Max: 36.7 (19 Sep 2023 14:06)  T(F): 98 (19 Sep 2023 14:06), Max: 98 (19 Sep 2023 14:06)  HR: 66 (19 Sep 2023 14:06) (64 - 66)  BP: 107/57 (19 Sep 2023 14:06) (107/57 - 133/61)  BP(mean): 98 (19 Sep 2023 10:51) (98 - 98)  RR: 18 (19 Sep 2023 14:06) (17 - 18)  SpO2: 93% (19 Sep 2023 10:51) (93% - 93%)    Parameters below as of 19 Sep 2023 10:51    O2 Flow (L/min): 2    PE:  GEN-NAD, AAOx3, lethargic (likely 2/2 anaesthesia post-procedure)   PULM- Clear to auscultation bilaterally, fair air entry  CVS- +s1/s2 RRR no murmurs, left anterior chest wall device dressing c/d/i  GI- soft NT ND +bs, no rebound, no guarding  EXT- no edema  Neuro- unable to assess 2/2 lethargy                        15.9   8.34  )-----------( 169      ( 19 Sep 2023 06:18 )             49.0     09-19    135  |  90<L>  |  17  ----------------------------<  132<H>  3.4<L>   |  33<H>  |  0.9    Ca    8.7      19 Sep 2023 06:18  Mg     1.9     09-19    TPro  7.5  /  Alb  3.5  /  TBili  2.3<H>  /  DBili  x   /  AST  27  /  ALT  10  /  AlkPhos  133<H>  09-19        Urinalysis Basic - ( 19 Sep 2023 06:18 )    Color: x / Appearance: x / SG: x / pH: x  Gluc: 132 mg/dL / Ketone: x  / Bili: x / Urobili: x   Blood: x / Protein: x / Nitrite: x   Leuk Esterase: x / RBC: x / WBC x   Sq Epi: x / Non Sq Epi: x / Bacteria: x          MEDICATIONS  (STANDING):  amantadine 100 milliGRAM(s) Oral daily  aspirin enteric coated 81 milliGRAM(s) Oral daily  atorvastatin 10 milliGRAM(s) Oral at bedtime  benztropine 0.5 milliGRAM(s) Oral daily  cephalexin 500 milliGRAM(s) Oral every 12 hours  FLUoxetine 10 milliGRAM(s) Oral daily  folic acid 1 milliGRAM(s) Oral daily  furosemide    Tablet 40 milliGRAM(s) Oral daily  lisinopril 5 milliGRAM(s) Oral daily  metoprolol succinate ER 25 milliGRAM(s) Oral daily  oxybutynin XL 5 milliGRAM(s) Oral daily  pantoprazole    Tablet 40 milliGRAM(s) Oral two times a day

## 2023-09-19 NOTE — PROGRESS NOTE ADULT - ASSESSMENT
71 yo female with pmh of HLD, CAD, PPM , schizophrenia and active smoker now presents with shortness of breath.      #HFrEF- unknown new - echo 9/16-> ef of 35-40%- EKG and trop unremarkable   #severe TR regurg  #h/o CAD   #h/o PPM - paced rhythm now  - CXR and echo reviewed   - continue lasix 40 mg BID   - daily weight   - monitor Is and Os   - cardio following   - c/w aspirin, toprol and lisinopril  -EP replacing generator; will follow up in 6 months regarding need for AICD     #Cough  -Procal and RVP neg    #Schizophrenia   - c/w home meds    - qtc 548, pt has PPM     Diet - soft and bite sized, but NPO for procedure  DVT ppx - Lovenox  Dispo- [facility]

## 2023-09-20 PROBLEM — Z00.00 ENCOUNTER FOR PREVENTIVE HEALTH EXAMINATION: Status: ACTIVE | Noted: 2023-01-01

## 2023-09-20 NOTE — PROGRESS NOTE ADULT - SUBJECTIVE AND OBJECTIVE BOX
[place], [place]  72y Female    CHIEF COMPLAINT:    Patient is a 72y old  Female who presents with a chief complaint of Shortness of breath and Dizziness (20 Sep 2023 08:34)      INTERVAL HPI/OVERNIGHT EVENTS:    Patient seen and examined.    ROS: All other systems are negative.    Vital Signs:    T(F): 97 (23 @ 12:35), Max: 97.8 (23 @ 04:32)  HR: 62 (23 @ 12:35) (58 - 65)  BP: 110/61 (23 @ 12:35) (110/61 - 145/60)  RR: 18 (23 @ 12:35) (18 - 18)  SpO2: 94% (23 @ 20:31) (94% - 94%)  I&O's Summary    19 Sep 2023 07:01  -  20 Sep 2023 07:00  --------------------------------------------------------  IN: 0 mL / OUT: 700 mL / NET: -700 mL    20 Sep 2023 07:01  -  20 Sep 2023 15:15  --------------------------------------------------------  IN: 338 mL / OUT: 250 mL / NET: 88 mL      Daily     Daily Weight in k (20 Sep 2023 04:32)  CAPILLARY BLOOD GLUCOSE          PHYSICAL EXAM:    GENERAL:  NAD  SKIN: No rashes or lesions  HENT: Atraumatic. Normocephalic. PERRL. Moist membranes.  NECK: Supple, No JVD. No lymphadenopathy.  PULMONARY: CTA B/L. No wheezing. No rales  CVS: Normal S1, S2. Rate and Rhythm are regular. No murmurs.  ABDOMEN/GI: Soft, Nontender, Nondistended; BS present  EXTREMITIES: Peripheral pulses intact. No edema B/L LE.  NEUROLOGIC:  No motor or sensory deficit.  PSYCH: Alert & oriented x 3    Consultant(s) Notes Reviewed:  [x ] YES  [ ] NO  Care Discussed with Consultants/Other Providers [ x] YES  [ ] NO    EKG reviewed  Telemetry reviewed    LABS:                        15.2   7.67  )-----------( 146      ( 20 Sep 2023 06:44 )             46.3         134<L>  |  91<L>  |  19  ----------------------------<  120<H>  3.3<L>   |  30  |  0.7    Ca    8.5      20 Sep 2023 06:44  Mg     1.6         TPro  7.0  /  Alb  3.4<L>  /  TBili  3.2<H>  /  DBili  x   /  AST  22  /  ALT  7   /  AlkPhos  120<H>                RADIOLOGY & ADDITIONAL TESTS:    < from: Xray Chest 1 View- PORTABLE-Routine (23 @ 08:05) >    Impression:  Stable diffuse interstitial opacities. No consolidation effusion or   pneumothorax.      < end of copied text >    Imaging or report Personally Reviewed:  [x ] YES  [ ] NO    Medications:  Standing  amantadine 100 milliGRAM(s) Oral daily  aspirin enteric coated 81 milliGRAM(s) Oral daily  atorvastatin 10 milliGRAM(s) Oral at bedtime  benztropine 0.5 milliGRAM(s) Oral daily  cephalexin 500 milliGRAM(s) Oral every 12 hours  FLUoxetine 10 milliGRAM(s) Oral daily  folic acid 1 milliGRAM(s) Oral daily  furosemide    Tablet 40 milliGRAM(s) Oral daily  lisinopril 5 milliGRAM(s) Oral daily  metoprolol succinate ER 25 milliGRAM(s) Oral daily  oxybutynin XL 5 milliGRAM(s) Oral daily  pantoprazole    Tablet 40 milliGRAM(s) Oral two times a day  regadenoson Injectable 0.4 milliGRAM(s) IV Push once    PRN Meds  acetaminophen     Tablet .. 650 milliGRAM(s) Oral every 6 hours PRN  aluminum hydroxide/magnesium hydroxide/simethicone Suspension 30 milliLiter(s) Oral every 4 hours PRN  melatonin 3 milliGRAM(s) Oral at bedtime PRN  ondansetron Injectable 4 milliGRAM(s) IV Push every 8 hours PRN      Case discussed with resident    Care discussed with pt/family           [place], [place]  72y Female    CHIEF COMPLAINT:    Patient is a 72y old  Female who presents with a chief complaint of Shortness of breath and Dizziness (20 Sep 2023 08:34)      INTERVAL HPI/OVERNIGHT EVENTS:    Patient seen and examined. Denies sob. No cp. No palpitations.     ROS: All other systems are negative.    Vital Signs:    T(F): 97 (23 @ 12:35), Max: 97.8 (23 @ 04:32)  HR: 62 (23 @ 12:35) (58 - 65)  BP: 110/61 (23 @ 12:35) (110/61 - 145/60)  RR: 18 (23 @ 12:35) (18 - 18)  SpO2: 94% (23 @ 20:31) (94% - 94%)  I&O's Summary    19 Sep 2023 07:  -  20 Sep 2023 07:00  --------------------------------------------------------  IN: 0 mL / OUT: 700 mL / NET: -700 mL    20 Sep 2023 07:01  -  20 Sep 2023 15:15  --------------------------------------------------------  IN: 338 mL / OUT: 250 mL / NET: 88 mL      Daily     Daily Weight in k (20 Sep 2023 04:32)  CAPILLARY BLOOD GLUCOSE          PHYSICAL EXAM:    GENERAL:  NAD  SKIN: No rashes or lesions  HENT: Atraumatic. Normocephalic. PERRL. Moist membranes.  NECK: Supple, No JVD. No lymphadenopathy.  PULMONARY: +ve fine crackles in the lower chest post B/L. No wheezing.   CVS: Normal S1, S2. Rate and Rhythm are regular. No murmurs.  ABDOMEN/GI: Soft, Nontender, Nondistended; BS present  EXTREMITIES: Peripheral pulses intact. No edema B/L LE.  NEUROLOGIC:  No motor or sensory deficit.  PSYCH: Alert & oriented x 3    Consultant(s) Notes Reviewed:  [x ] YES  [ ] NO  Care Discussed with Consultants/Other Providers [ x] YES  [ ] NO    EKG reviewed  Telemetry reviewed    LABS:                        15.2   7.67  )-----------( 146      ( 20 Sep 2023 06:44 )             46.3         134<L>  |  91<L>  |  19  ----------------------------<  120<H>  3.3<L>   |  30  |  0.7    Ca    8.5      20 Sep 2023 06:44  Mg     1.6         TPro  7.0  /  Alb  3.4<L>  /  TBili  3.2<H>  /  DBili  x   /  AST  22  /  ALT  7   /  AlkPhos  120<H>                RADIOLOGY & ADDITIONAL TESTS:    < from: Xray Chest 1 View- PORTABLE-Routine (23 @ 08:05) >    Impression:  Stable diffuse interstitial opacities. No consolidation effusion or   pneumothorax.      < end of copied text >    Imaging or report Personally Reviewed:  [x ] YES  [ ] NO    Medications:  Standing  amantadine 100 milliGRAM(s) Oral daily  aspirin enteric coated 81 milliGRAM(s) Oral daily  atorvastatin 10 milliGRAM(s) Oral at bedtime  benztropine 0.5 milliGRAM(s) Oral daily  cephalexin 500 milliGRAM(s) Oral every 12 hours  FLUoxetine 10 milliGRAM(s) Oral daily  folic acid 1 milliGRAM(s) Oral daily  furosemide    Tablet 40 milliGRAM(s) Oral daily  lisinopril 5 milliGRAM(s) Oral daily  metoprolol succinate ER 25 milliGRAM(s) Oral daily  oxybutynin XL 5 milliGRAM(s) Oral daily  pantoprazole    Tablet 40 milliGRAM(s) Oral two times a day  regadenoson Injectable 0.4 milliGRAM(s) IV Push once    PRN Meds  acetaminophen     Tablet .. 650 milliGRAM(s) Oral every 6 hours PRN  aluminum hydroxide/magnesium hydroxide/simethicone Suspension 30 milliLiter(s) Oral every 4 hours PRN  melatonin 3 milliGRAM(s) Oral at bedtime PRN  ondansetron Injectable 4 milliGRAM(s) IV Push every 8 hours PRN      Case discussed with resident    Care discussed with pt/family

## 2023-09-20 NOTE — PROGRESS NOTE ADULT - ASSESSMENT
73 yo female with pmh of HLD, CAD, anemia, schizophrenia, PPM (indication not clear), active smoker now presents with shortness of breath and dizziness. She has been having intermittent dizziness and GRIFFIN for the past 5 days.     Acute HFrEF  CAD / DL  Schizophrenia  Hyponatremia  CKD-3  Tobacco use             PLAN:    ·	Tele reviewed. No events  ·	CE x 3 are negative  ·	S/P battery change of the PPM on 9/19  ·	ECHO reviewed. EF is 35-40%. Moderate to severe TR. Dilated IVC. Respiratory size variation <50%  ·	CXR on 9/19 showed stable diffuse interstitial opacities.   ·	O/E pt is euvolemic. Cont Lasix 40 mg po daily  ·	Check i's and o's and daily wt.   ·	Low salt diet and water restriction to 1.5 L/D  ·	Monitor electrolytes and renal function. Replete Mg and K  ·	Cardiology f/u for ischemia w/u  ·	On GDMT. Cont Lisinopril and Metoprolol Succinate  ·	Cont her other home meds.     Progress Note Handoff    Pending (specify):  Consults_________, Tests________, Test Results_______, Other_________  Family discussion:  Disposition: Home___/SNF___/Other________/Unknown at this time________    Chucky Mckinley MD  Spectra: [phone]     71 yo female with pmh of HLD, CAD, anemia, schizophrenia, PPM (indication not clear), active smoker now presents with shortness of breath and dizziness. She has been having intermittent dizziness and GRIFFIN for the past 5 days.     Acute HFrEF  CAD / DL  Schizophrenia  Hyponatremia  CKD-3  Tobacco use             PLAN:    ·	Tele reviewed. No events  ·	CE x 3 are negative  ·	S/P battery change of the PPM on 9/19  ·	ECHO reviewed. EF is 35-40%. Moderate to severe TR. Dilated IVC. Respiratory size variation <50%  ·	CXR on 9/19 showed stable diffuse interstitial opacities.   ·	Pt is fluid overload  ·	Switch her back to Lasix 40 mg iv twice a day. Give first dose now  ·	Check i's and o's and daily wt.   ·	Low salt diet and water restriction to 1.5 L/D  ·	Monitor electrolytes and renal function. Replete Mg and K  ·	Cardiology f/u for ischemia w/u  ·	On GDMT. Cont Lisinopril and Metoprolol Succinate  ·	Cont her other home meds.   ·	Check nuclear stress test result    Progress Note Handoff    Pending (specify):  Consults_________, Tests________, Test Results_______, Other_Fluid overload________  Family discussion:  Disposition: Home___/SNF___/Other________/Unknown at this time________    Chucky Mckinley MD  Spectra: [phone]

## 2023-09-20 NOTE — PROGRESS NOTE ADULT - SUBJECTIVE AND OBJECTIVE BOX
24H events:    Patient is a 72y old Female who presents with a chief complaint of Shortness of breath and Dizziness (19 Sep 2023 14:55)    Primary diagnosis of Acute CHF    Today is 5d of hospitalization. This morning patient was seen and examined at bedside, resting comfortably in bed.    No acute or major events overnight. Patient denies fevers, chills, headache, chest pain, dyspnea, cough, nausea, vomiting, abdominal pain or BM changes.   s/p pacemaker generator replacement yesterday.    Code Status:    Family communication:  Contact date:  Name of person contacted:  Relationship to patient:  Communication details:  What matters most:    PAST MEDICAL & SURGICAL HISTORY  Acute schizophrenia    CAD, multiple vessel    Acute MI    Abnormal cholesterol test    Absolute anemia    Artificial pacemaker      SOCIAL HISTORY:  Social History:      ALLERGIES:  eggs (Rash)  No Known Drug Allergies    MEDICATIONS:  STANDING MEDICATIONS  amantadine 100 milliGRAM(s) Oral daily  aspirin enteric coated 81 milliGRAM(s) Oral daily  atorvastatin 10 milliGRAM(s) Oral at bedtime  benztropine 0.5 milliGRAM(s) Oral daily  cephalexin 500 milliGRAM(s) Oral every 12 hours  FLUoxetine 10 milliGRAM(s) Oral daily  folic acid 1 milliGRAM(s) Oral daily  furosemide    Tablet 40 milliGRAM(s) Oral daily  lisinopril 5 milliGRAM(s) Oral daily  magnesium sulfate  IVPB 2 Gram(s) IV Intermittent once  metoprolol succinate ER 25 milliGRAM(s) Oral daily  oxybutynin XL 5 milliGRAM(s) Oral daily  pantoprazole    Tablet 40 milliGRAM(s) Oral two times a day  potassium chloride  20 mEq/100 mL IVPB 20 milliEquivalent(s) IV Intermittent once  regadenoson Injectable 0.4 milliGRAM(s) IV Push once    PRN MEDICATIONS  acetaminophen     Tablet .. 650 milliGRAM(s) Oral every 6 hours PRN  aluminum hydroxide/magnesium hydroxide/simethicone Suspension 30 milliLiter(s) Oral every 4 hours PRN  melatonin 3 milliGRAM(s) Oral at bedtime PRN  ondansetron Injectable 4 milliGRAM(s) IV Push every 8 hours PRN    VITALS:   T(F): 97.8  HR: 58  BP: 145/60  RR: 18  SpO2: 94%    PHYSICAL EXAM:  GENERAL:   ( x) NAD, lying in bed comfortably     (  ) obtunded     (  ) lethargic     (  ) somnolent    HEAD:   ( x) Atraumatic     (  ) hematoma     (  ) laceration (specify location:       )     NECK:  (x) Supple     (  ) neck stiffness     (  ) nuchal rigidity     (  )  no JVD     (  ) JVD present ( -- cm)    HEART:  Rate -->     (x) normal rate     (  ) bradycardic     (  ) tachycardic  Rhythm -->     (x) regular     (  ) regularly irregular     (  ) irregularly irregular  Murmurs -->     (x) normal s1s2     (  ) systolic murmur     (  ) diastolic murmur     (  ) continuous murmur      (  ) S3 present     (  ) S4 present    LUNGS:   ( x)Unlabored respirations     (  ) tachypnea  ( x) B/L air entry     (  ) decreased breath sounds in:  (location     )    ( x) no adventitious sound     (  ) crackles     (  ) wheezing      (  ) rhonchi      (specify location:       )  (  ) chest wall tenderness (specify location:       )    ABDOMEN:   ( x) Soft     (  ) tense   |   (X  ) nondistended     (  ) distended   |   ( X ) +BS     (  ) hypoactive bowel sounds     (  ) hyperactive bowel sounds  ( x) nontender     (  ) RUQ tenderness     (  ) RLQ tenderness     (  ) LLQ tenderness     (  ) epigastric tenderness     (  ) diffuse tenderness  (  ) Splenomegaly      (  ) Hepatomegaly      (  ) Jaundice     (  ) ecchymosis     EXTREMITIES:  ( x) Normal     (  ) Rash     (  ) ecchymosis     (  ) varicose veins      (  ) pitting edema     (  ) non-pitting edema   (  ) ulceration     (  ) gangrene:     (location:     )    NERVOUS SYSTEM:    ( x) A&Ox3     (  ) confused     (  ) lethargic  CN II-XII:     (  ) Intact     (  ) deficits found     (Specify:     )   Upper extremities:     (  ) no sensorimotor deficits     (  ) weakness     (  ) loss of proprioception/vibration     (  ) loss of touch/temperature (specify:    )  Lower extremities:     (  ) no sensorimotor deficits     (  ) weakness     (  ) loss of proprioception/vibration     (  ) loss of touch/temperature (specify:    )    LABS:                        15.9   8.34  )-----------( 169      ( 19 Sep 2023 06:18 )             49.0     09-20    134<L>  |  91<L>  |  19  ----------------------------<  120<H>  3.3<L>   |  30  |  0.7    Ca    8.5      20 Sep 2023 06:44  Mg     1.6     09-20    TPro  7.0  /  Alb  3.4<L>  /  TBili  3.2<H>  /  DBili  x   /  AST  22  /  ALT  7   /  AlkPhos  120<H>  09-20      Urinalysis Basic - ( 20 Sep 2023 06:44 )    Color: x / Appearance: x / SG: x / pH: x  Gluc: 120 mg/dL / Ketone: x  / Bili: x / Urobili: x   Blood: x / Protein: x / Nitrite: x   Leuk Esterase: x / RBC: x / WBC x   Sq Epi: x / Non Sq Epi: x / Bacteria: x                RADIOLOGY:

## 2023-09-20 NOTE — PROGRESS NOTE ADULT - ASSESSMENT
71 yo female with pmh of HLD, CAD, PPM , schizophrenia and active smoker now presents with shortness of breath.      #HFrEF- unknown new - echo 9/16-> ef of 35-40%- EKG and trop unremarkable   #severe TR regurg  #h/o CAD   #h/o PPM - paced rhythm now  - CXR and echo reviewed   - continue lasix 40 mg BID   - daily weight   - monitor Is and Os   - cardio following   - c/w aspirin, toprol and lisinopril  -EP replaced generator; will follow up in 6 months regarding need for AICD  -keflex q12 X 5 days s/p procedure   -stress test today    #Cough  -Procal and RVP neg    #Schizophrenia   - c/w home meds    - qtc 548, pt has PPM     Diet - soft and bite sized  DVT ppx - Lovenox  Dispo- [facility]

## 2023-09-21 NOTE — PROGRESS NOTE ADULT - SUBJECTIVE AND OBJECTIVE BOX
24H events:    Patient is a 72y old Female who presents with a chief complaint of Shortness of breath and Dizziness (21 Sep 2023 12:09)    Primary diagnosis of Acute CHF      Day 1:      Today is 6d of hospitalization. This morning patient was seen and examined at bedside, resting comfortably in bed.    No acute or major events overnight. Patient denies fevers, chills, headache, chest pain, dyspnea, cough, nausea, vomiting, abdominal pain or BM changes.     Code Status:    Family communication:  Contact date:  Name of person contacted:  Relationship to patient:  Communication details:  What matters most:    PAST MEDICAL & SURGICAL HISTORY  Acute schizophrenia    CAD, multiple vessel    Acute MI    Abnormal cholesterol test    Absolute anemia    Artificial pacemaker      SOCIAL HISTORY:  Social History: General acute hospital      ALLERGIES:  eggs (Rash)  No Known Drug Allergies    MEDICATIONS:  STANDING MEDICATIONS  amantadine 100 milliGRAM(s) Oral daily  aspirin enteric coated 81 milliGRAM(s) Oral daily  atorvastatin 10 milliGRAM(s) Oral at bedtime  benztropine 0.5 milliGRAM(s) Oral daily  cephalexin 500 milliGRAM(s) Oral every 12 hours  FLUoxetine 10 milliGRAM(s) Oral daily  folic acid 1 milliGRAM(s) Oral daily  lisinopril 5 milliGRAM(s) Oral daily  metoprolol succinate ER 25 milliGRAM(s) Oral daily  oxybutynin XL 5 milliGRAM(s) Oral daily  pantoprazole    Tablet 40 milliGRAM(s) Oral two times a day  potassium chloride  20 mEq/100 mL IVPB 20 milliEquivalent(s) IV Intermittent every 2 hours  regadenoson Injectable 0.4 milliGRAM(s) IV Push once    PRN MEDICATIONS  acetaminophen     Tablet .. 650 milliGRAM(s) Oral every 6 hours PRN  aluminum hydroxide/magnesium hydroxide/simethicone Suspension 30 milliLiter(s) Oral every 4 hours PRN  melatonin 3 milliGRAM(s) Oral at bedtime PRN  ondansetron Injectable 4 milliGRAM(s) IV Push every 8 hours PRN    VITALS:   T(F): 97.2  HR: 67  BP: 114/58  RR: 18  SpO2: 93%    PHYSICAL EXAM:  GENERAL:   ( x) NAD, lying in bed comfortably     (  ) obtunded     (  ) lethargic     (  ) somnolent    HEAD:   ( x) Atraumatic     (  ) hematoma     (  ) laceration (specify location:       )     NECK:  (x) Supple     (  ) neck stiffness     (  ) nuchal rigidity     (  )  no JVD     (  ) JVD present ( -- cm)    HEART:  Rate -->     (x) normal rate     (  ) bradycardic     (  ) tachycardic  Rhythm -->     (x) regular     (  ) regularly irregular     (  ) irregularly irregular  Murmurs -->     (x) normal s1s2     (  ) systolic murmur     (  ) diastolic murmur     (  ) continuous murmur      (  ) S3 present     (  ) S4 present    LUNGS:   ( x)Unlabored respirations     (  ) tachypnea  ( x) B/L air entry     (  ) decreased breath sounds in:  (location     )    ( x) no adventitious sound     (  ) crackles     (  ) wheezing      (  ) rhonchi      (specify location:       )  (  ) chest wall tenderness (specify location:       )    ABDOMEN:   ( x) Soft     (  ) tense   |   (X  ) nondistended     (  ) distended   |   ( X ) +BS     (  ) hypoactive bowel sounds     (  ) hyperactive bowel sounds  ( x) nontender     (  ) RUQ tenderness     (  ) RLQ tenderness     (  ) LLQ tenderness     (  ) epigastric tenderness     (  ) diffuse tenderness  (  ) Splenomegaly      (  ) Hepatomegaly      (  ) Jaundice     (  ) ecchymosis     EXTREMITIES:  ( x) Normal     (  ) Rash     (  ) ecchymosis     (  ) varicose veins      (  ) pitting edema     (  ) non-pitting edema   (  ) ulceration     (  ) gangrene:     (location:     )    NERVOUS SYSTEM:    ( x) A&Ox3     (  ) confused     (  ) lethargic  CN II-XII:     (  ) Intact     (  ) deficits found     (Specify:     )   Upper extremities:     (  ) no sensorimotor deficits     (  ) weakness     (  ) loss of proprioception/vibration     (  ) loss of touch/temperature (specify:    )  Lower extremities:     (  ) no sensorimotor deficits     (  ) weakness     (  ) loss of proprioception/vibration     (  ) loss of touch/temperature (specify:    )    SKIN:   ( X ) No rashes or lesions     (  ) maculopapular rash     (  ) pustules     (  ) vesicles     (  ) ulcer     (  ) ecchymosis     (specify location:     )    AMPAC score :    (  ) Indwelling Gonzalez Catheter:   Date insterted:    Reason (  ) Critical illness     (  ) urinary retention    (  ) Accurate Ins/Outs Monitoring     (  ) CMO patient    (  ) Central Line:   Date inserted:  Location: (  ) Right IJ     (  ) Left IJ     (  ) Right Fem     (  ) Left Fem    (  ) SPC        (  ) pigtail       (  ) PEG tube       (  ) colostomy       (  ) jejunostomy  (  ) U-Dall    LABS:                        15.5   6.67  )-----------( 161      ( 21 Sep 2023 06:56 )             47.8     09-21    135  |  91<L>  |  24<H>  ----------------------------<  131<H>  2.8<L>   |  31  |  0.9    Ca    8.4      21 Sep 2023 06:56  Mg     1.8     09-21    TPro  7.4  /  Alb  3.4<L>  /  TBili  2.2<H>  /  DBili  x   /  AST  30  /  ALT  11  /  AlkPhos  142<H>  09-21      Urinalysis Basic - ( 21 Sep 2023 06:56 )    Color: x / Appearance: x / SG: x / pH: x  Gluc: 131 mg/dL / Ketone: x  / Bili: x / Urobili: x   Blood: x / Protein: x / Nitrite: x   Leuk Esterase: x / RBC: x / WBC x   Sq Epi: x / Non Sq Epi: x / Bacteria: x                RADIOLOGY:

## 2023-09-21 NOTE — DISCHARGE NOTE PROVIDER - NSDCCPCAREPLAN_GEN_ALL_CORE_FT
PRINCIPAL DISCHARGE DIAGNOSIS  Diagnosis: Acute CHF  Assessment and Plan of Treatment: Congestive heart failure (CHF) is a chronic condition in which the heart has trouble pumping blood. In some cases of heart failure, fluid may back up into your lungs or you may have swelling (edema) in your lower legs. There are many causes of heart failure including high blood pressure, coronary artery disease, abnormal heart valves, heart muscle disease, lung disease, diabetes, etc. Symptoms include shortness of breath with activity or when lying flat, cough, swelling of the legs, fatigue, or increased urination during the night.   Treatment is aimed at managing the symptoms of heart failure and may include lifestyle changes, medications, or surgical procedures. Take medicines only as directed by your health care provider and do not stop unless instructed to do so. Eat heart-healthy foods with low or no trans/saturated fats, cholesterol and salt. Weigh yourself every day for early recognition of fluid accumulation.  SEEK IMMEDIATE MEDICAL CARE IF YOU HAVE ANY OF THE FOLLOWING SYMPTOMS: shortness of breath, change in mental status, chest pain, lightheadedness/dizziness/fainting, or worsening of symptoms including not being able to conduct normal physical activity.        SECONDARY DISCHARGE DIAGNOSES  Diagnosis: Disorder of pacemaker pulse generator  Assessment and Plan of Treatment: Pacemaker generator replaced.  Follow up with EP in 1 week.    Diagnosis: Hypokalemia  Assessment and Plan of Treatment:     Diagnosis: Hypomagnesemia  Assessment and Plan of Treatment:      PRINCIPAL DISCHARGE DIAGNOSIS  Diagnosis: Acute CHF  Assessment and Plan of Treatment: EF noted to be low 35%, seen by cardiology, need to repeat Echo in few weeks, could be related to low PPM battery causing desynchronized   currently on goal directed medical therapy, needs to follow up with cardiology in 1 month  daily weight, fluid restriction 1.2 Liter per day         SECONDARY DISCHARGE DIAGNOSES  Diagnosis: Disorder of pacemaker pulse generator  Assessment and Plan of Treatment: Pacemaker generator replaced.  Follow up with EP in 1 week.    Diagnosis: Hypokalemia  Assessment and Plan of Treatment:     Diagnosis: Hypomagnesemia  Assessment and Plan of Treatment:     Diagnosis: ILD (interstitial lung disease)  Assessment and Plan of Treatment: CT chest showed evidence of ILD UIP pattern, also patient has dilated esophagus, needs to follow up with GI and rheumatology to rule out scleroderma

## 2023-09-21 NOTE — PROGRESS NOTE ADULT - ASSESSMENT
71 yo female with pmh of HLD, CAD, anemia, schizophrenia, PPM (indication not clear), active smoker now presents with shortness of breath and dizziness. She has been having intermittent dizziness and GRIFFIN for the past 5 days.     Acute HFrEF  CAD / DL  Schizophrenia  Hyponatremia  CKD-3  Tobacco use             PLAN:    ·	Tele reviewed. No events  ·	CE x 3 are negative  ·	S/P battery change of the PPM on 9/19  ·	ECHO reviewed. EF is 35-40%. Moderate to severe TR. Dilated IVC. Respiratory size variation <50%  ·	CXR on 9/19 showed stable diffuse interstitial opacities.   ·	Looks euvolemic today. Will d/c IV diuretics and start her on Torsemide 20 mg po daily  ·	Check i's and o's and daily wt.   ·	Low salt diet and water restriction to 1.5 L/D  ·	Monitor electrolytes and renal function. Replete Mg  ·	On GDMT. Cont Lisinopril and Metoprolol Succinate  ·	Cont her other home meds.   ·	Nuclear stress test is negative for ischemia.     Progress Note Handoff    Pending (specify):  Consults_________, Tests________, Test Results_______, Other_Anticipate d/c in AM________  Family discussion:  Disposition: Home___/SNF___/Other________/Unknown at this time________    Chucky Mckinley MD  Spectra: [phone]

## 2023-09-21 NOTE — DISCHARGE NOTE PROVIDER - CARE PROVIDERS DIRECT ADDRESSES
,[email or web address]_Unknown ,[email or web address]_Unknown,DirectAddress_Unknown,[email or web address]

## 2023-09-21 NOTE — DISCHARGE NOTE PROVIDER - CARE PROVIDER_API CALL
Alphonse Davila .  Internal Medicine  [address] [place] [address]  Phone: [phone]  Fax: [phone]  Follow Up Time: 2 weeks    Petra Mederos  Cardiac Electrophysiology  [address] [place] [address]  Phone: [phone]  Fax: [phone]  Follow Up Time: 1 week   Alphonse Davila.  Internal Medicine  [address] [place] [address]  Phone: [phone]  Fax: [phone]  Follow Up Time: 2 weeks    Petra Mederos  Cardiac Electrophysiology  [address] [place] [address]  Phone: [phone]  Fax: [phone]  Follow Up Time: 1 week    Barbara Zarate  Rheumatology  [facility] [place] [address]  Phone: [phone]  Fax: [phone]  Follow Up Time: 2 weeks    Alexandria Ortega  Gastroenterology  [address] [place] [address]  Phone: [phone]  Fax: [phone]  Follow Up Time: 2 weeks

## 2023-09-21 NOTE — PROGRESS NOTE ADULT - SUBJECTIVE AND OBJECTIVE BOX
D[place], [place]  72y Female    CHIEF COMPLAINT:    Patient is a 72y old  Female who presents with a chief complaint of Shortness of breath and Dizziness (21 Sep 2023 13:05)      INTERVAL HPI/OVERNIGHT EVENTS:    Patient seen and examined. No sob. Feels better. No more dizziness.     ROS: All other systems are negative.    Vital Signs:    T(F): 91.1 (09-21-23 @ 12:12), Max: 97.2 (09-20-23 @ 20:01)  HR: 65 (09-21-23 @ 12:12) (59 - 67)  BP: 106/51 (09-21-23 @ 12:12) (106/51 - 114/70)  RR: 18 (09-21-23 @ 12:12) (18 - 18)  SpO2: 93% (09-20-23 @ 20:01) (93% - 93%)  I&O's Summary    20 Sep 2023 07:01  -  21 Sep 2023 07:00  --------------------------------------------------------  IN: 338 mL / OUT: 250 mL / NET: 88 mL    21 Sep 2023 07:01  -  21 Sep 2023 15:05  --------------------------------------------------------  IN: 460 mL / OUT: 550 mL / NET: -90 mL      Daily     Daily   CAPILLARY BLOOD GLUCOSE          PHYSICAL EXAM:    GENERAL:  NAD  SKIN: No rashes or lesions  HENT: Atraumatic. Normocephalic. PERRL. Moist membranes.  NECK: Supple, No JVD. No lymphadenopathy.  PULMONARY: CTA B/L. No wheezing. No rales  CVS: Normal S1, S2. Rate and Rhythm are regular. No murmurs.  ABDOMEN/GI: Soft, Nontender, Nondistended; BS present  EXTREMITIES: Peripheral pulses intact. No edema B/L LE.  NEUROLOGIC:  No motor or sensory deficit.  PSYCH: Alert & oriented x 3    Consultant(s) Notes Reviewed:  [x ] YES  [ ] NO  Care Discussed with Consultants/Other Providers [ x] YES  [ ] NO    EKG reviewed  Telemetry reviewed    LABS:                        15.5   6.67  )-----------( 161      ( 21 Sep 2023 06:56 )             47.8     09-21    135  |  91<L>  |  24<H>  ----------------------------<  131<H>  2.8<L>   |  31  |  0.9    Ca    8.4      21 Sep 2023 06:56  Mg     1.8     09-21    TPro  7.4  /  Alb  3.4<L>  /  TBili  2.2<H>  /  DBili  x   /  AST  30  /  ALT  11  /  AlkPhos  142<H>  09-21              RADIOLOGY & ADDITIONAL TESTS:      Imaging or report Personally Reviewed:  [ ] YES  [ ] NO    Medications:  Standing  amantadine 100 milliGRAM(s) Oral daily  aspirin enteric coated 81 milliGRAM(s) Oral daily  atorvastatin 10 milliGRAM(s) Oral at bedtime  benztropine 0.5 milliGRAM(s) Oral daily  cephalexin 500 milliGRAM(s) Oral every 12 hours  FLUoxetine 10 milliGRAM(s) Oral daily  folic acid 1 milliGRAM(s) Oral daily  lisinopril 5 milliGRAM(s) Oral daily  metoprolol succinate ER 25 milliGRAM(s) Oral daily  oxybutynin XL 5 milliGRAM(s) Oral daily  pantoprazole    Tablet 40 milliGRAM(s) Oral two times a day  potassium chloride  20 mEq/100 mL IVPB 20 milliEquivalent(s) IV Intermittent every 2 hours  regadenoson Injectable 0.4 milliGRAM(s) IV Push once    PRN Meds  acetaminophen     Tablet .. 650 milliGRAM(s) Oral every 6 hours PRN  aluminum hydroxide/magnesium hydroxide/simethicone Suspension 30 milliLiter(s) Oral every 4 hours PRN  melatonin 3 milliGRAM(s) Oral at bedtime PRN  ondansetron Injectable 4 milliGRAM(s) IV Push every 8 hours PRN      Case discussed with resident    Care discussed with pt/family

## 2023-09-21 NOTE — DISCHARGE NOTE PROVIDER - HOSPITAL COURSE
73 yo female with pmh of HLD, CAD, anemia, schizophrenia, PPM (indication not clear), active smoker now presents with shortness of breath and dizziness.     Pt is a poor historian. She says she's brought in here for weakness and low sodium. On further questioning, she revealed she has been having intermittent dizziness episodes and decreased capacity to walk for the past 5 days due to dyspnea. Denies exertional chest pain, fever, nausea, vomiting, diaphoresis, hemoptysis, orthopnea, PND, LE pain or swelling, recent immobilization or travel or other associated complaints at present. She has no bowel and bladder complaints.    ED vitals - afebrile, BP - 123/78mmhg; HR - 65 /min; SPo2 -100% on 3lpm  ECG - Ventricular paced rhythm @65/min; trop <0.01  Labs - BNP -13.3k; Na- 129; K - no reported ---> rpt sent; rest wnl  Chest x ray - b/l pleural effusion with vascular congestion and cardiomegaly     Pt admitted for CHF management.     73 yo female with pmh of HLD, CAD, anemia, schizophrenia, PPM (indication not clear), active smoker now presents with shortness of breath and dizziness. She has been having intermittent dizziness and GRIFFIN for the past 5 days. Seen by EP and cardio.    #HFrEF- unknown new - echo 9/16-> ef of 35-40%- EKG and trop unremarkable   #severe TR regurg  #h/o CAD   #h/o PPM - paced rhythm now  - CXR and echo reviewed   - has been on lasix 40 mg BID - will DC with torsemide 20 daily  - daily weight   - monitor Is and Os   - cardio following   - c/w aspirin, toprol and lisinopril  -EP replaced generator; will follow up in 6 months regarding need for AICD  -keflex q12 X 5 days s/p procedure   -stress test wnl    #Cough  -Procal and RVP neg    #Schizophrenia   - c/w home meds    - qtc 548, pt has PPM     #Hypokalemia  #hypomagnesemia   -repleted    Diet - soft and bite sized  DVT ppx - Lovenox  Dispo- [place]     71 yo female with pmh of HLD, CAD, anemia, schizophrenia, PPM (indication not clear), active smoker now presents with shortness of breath and dizziness.     Pt is a poor historian. She says she's brought in here for weakness and low sodium. On further questioning, she revealed she has been having intermittent dizziness episodes and decreased capacity to walk for the past 5 days due to dyspnea. Denies exertional chest pain, fever, nausea, vomiting, diaphoresis, hemoptysis, orthopnea, PND, LE pain or swelling, recent immobilization or travel or other associated complaints at present. She has no bowel and bladder complaints.      71 yo female with pmh of HLD, CAD, anemia, schizophrenia, PPM (indication not clear), active smoker presents with shortness of breath and dizziness.   She has been having intermittent dizziness and GRIFFIN for the past 5 days.     Acute HFrEF  new ILD on CT chest and dilated esophagus, suspected SC    CAD / DL  Schizophrenia  Hyponatremia  CKD-3  Tobacco use             PLAN:    Tele reviewed. No events  covid neg  CE x 3 are negative  S/P battery change of the PPM on 9/19  ECHO reviewed. EF is 35-40%. seen by EP, not clear if NICM new or old, stress test neg, probably desynchronized related given low PPM battery, on GDM  needs repeat echo after DC  CXR suspicious for ILD, CT chest UIP pattern, with dilated esophagus, need outpt f/iu w GI and Rheum   daily wt.   Low salt diet and water restriction to 1.5 L/D  Monitor electrolytes and renal function. Replete K  DC plan to [facility]       73 yo female with pmh of HLD, CAD, anemia, schizophrenia, PPM (indication not clear), active smoker now presents with shortness of breath and dizziness.     Pt is a poor historian. She says she's brought in here for weakness and low sodium. On further questioning, she revealed she has been having intermittent dizziness episodes and decreased capacity to walk for the past 5 days due to dyspnea. Denies exertional chest pain, fever, nausea, vomiting, diaphoresis, hemoptysis, orthopnea, PND, LE pain or swelling, recent immobilization or travel or other associated complaints at present. She has no bowel and bladder complaints.      73 yo female with pmh of HLD, CAD, anemia, schizophrenia, PPM (indication not clear), active smoker presents with shortness of breath and dizziness.   She has been having intermittent dizziness and GRIFFIN for the past 5 days.     Acute HFrEF  new ILD on CT chest and dilated esophagus, suspected SC    CAD / DL  Schizophrenia  Hyponatremia  CKD-3  Tobacco use        Tele reviewed. No events  covid neg  CE x 3 are negative  S/P battery change of the PPM on 9/19  ECHO reviewed. EF is 35-40%. seen by EP, not clear if NICM new or old, stress test neg, probably desynchronized related given low PPM battery, on GDM  needs repeat echo after DC  CXR suspicious for ILD, CT chest UIP pattern, with dilated esophagus, need outpt f/iu w GI and Rheum   daily wt.   Low salt diet and water restriction to 1.5 L/D  Monitor electrolytes and renal function. Replete K  DC plan to [facility]

## 2023-09-21 NOTE — DISCHARGE NOTE PROVIDER - NSDCFUSCHEDAPPT_GEN_ALL_CORE_FT
[facility]  E[facility] [id number] [facility]  Scheduled Appointment: 09/28/2023

## 2023-09-21 NOTE — DISCHARGE NOTE PROVIDER - ATTENDING DISCHARGE PHYSICAL EXAMINATION:
CONSTITUTIONAL: old lady chronically look ill  HEAD: Atraumatic, normocephalic  EYES: EOM intact, PERRLA, conjunctiva and sclera clear  ENT: Supple, no masses, no thyromegaly, no bruits, no JVD; moist mucous membranes  PULMONARY: Clear to auscultation bilaterally; no wheezes, rales, or rhonchi  CARDIOVASCULAR: Regular rate and rhythm; no murmurs, rubs, or gallops  GASTROINTESTINAL: Soft, non-tender, non-distended; bowel sounds present  MUSCULOSKELETAL: 2+ peripheral pulses; no clubbing, no cyanosis, no edema  NEUROLOGY: globally weak, alert and awake, oriented x1, non-focal  SKIN: No rashes or lesions; warm and dry CONSTITUTIONAL: old lady chronically ill looking   HEAD: Atraumatic, normocephalic  EYES: EOM intact, PERRLA, conjunctiva and sclera clear  ENT: Supple,   PULMONARY: Clear to auscultation bilaterally; no wheezes, rales, or rhonchi  CARDIOVASCULAR: Regular rate and rhythm;   GASTROINTESTINAL: Soft, non-tender, non-distended; bowel sounds present  MUSCULOSKELETAL: 2+ peripheral pulses; no clubbing, no cyanosis, no edema  NEUROLOGY: globally weak, alert and awake, oriented x1, non-focal  SKIN: No rashes or lesions; warm and dry

## 2023-09-21 NOTE — DISCHARGE NOTE PROVIDER - PROVIDER TOKENS
PROVIDER:[TOKEN:[[id number]],FOLLOWUP:[2 weeks]],PROVIDER:[TOKEN:[[id number]],FOLLOWUP:[1 week]] PROVIDER:[TOKEN:[[id number]],FOLLOWUP:[2 weeks]],PROVIDER:[TOKEN:[[id number]],FOLLOWUP:[1 week]],PROVIDER:[TOKEN:[[id number]],FOLLOWUP:[2 weeks]],PROVIDER:[TOKEN:[[id number]],FOLLOWUP:[2 weeks]]

## 2023-09-21 NOTE — PROGRESS NOTE ADULT - ASSESSMENT
73 yo female with pmh of HLD, CAD, anemia, schizophrenia, PPM (indication not clear), active smoker now presents with shortness of breath and dizziness.     Pt is a poor historian. She says she's brought in here for weakness and low sodium. On further questioning, she revealed she has been having intermittent dizziness episodes and decreased capacity to walk for the past 5 days due to dyspnea. Denies exertional chest pain, fever, nausea, vomiting, diaphoresis, hemoptysis, orthopnea, PND, LE pain or swelling, recent immobilization or travel or other associated complaints at present. She has no bowel and bladder complaints.    ED vitals - afebrile, BP - 123/78mmhg; HR - 65 /min; SPo2 -100% on 3lpm  ECG - Ventricular paced rhythm @65/min; trop <0.01  Labs - BNP -13.3k; Na- 129; K - no reported ---> rpt sent; rest wnl  Chest x ray - b/l pleural effusion with vascular congestion and cardiomegaly     Pt admitted for CHF management.     73 yo female with pmh of HLD, CAD, anemia, schizophrenia, PPM (indication not clear), active smoker now presents with shortness of breath and dizziness. She has been having intermittent dizziness and GRIFFIN for the past 5 days. Seen by EP and cardio.    #HFrEF- unknown new - echo 9/16-> ef of 35-40%- EKG and trop unremarkable   #severe TR regurg  #h/o CAD   #h/o PPM - paced rhythm now  - CXR and echo reviewed   - has been on lasix 40 mg BID - will DC with torsemide 20 daily  - daily weight   - monitor Is and Os   - cardio following   - c/w aspirin, toprol and lisinopril  -EP replaced generator; will follow up in 6 months regarding need for AICD  -keflex q12 X 5 days s/p procedure   -stress test wnl    #Cough  -Procal and RVP neg    #Schizophrenia   - c/w home meds    - qtc 548, pt has PPM     #Hypokalemia  #hypomagnesemia   -repleted  -BMP and Mg 1600    Diet - soft and bite sized  DVT ppx - Lovenox  Dispo- [facility]

## 2023-09-21 NOTE — DISCHARGE NOTE PROVIDER - NSDCMRMEDTOKEN_GEN_ALL_CORE_FT
amantadine 100 mg oral tablet: 1 tab(s) orally once a day  Amaryl 1 mg oral tablet: 1 tab(s) orally 2 times a day  Aspir 81 oral delayed release tablet: 1 tab(s) orally once a day  benztropine 0.5 mg oral tablet: 1 tab(s) orally once a day  cephalexin 500 mg oral capsule: 1 cap(s) orally every 12 hours  ferrous fumarate 324 mg (106 mg elemental iron) oral tablet: 1 tab(s) orally once a day  fluPHENAZine enanthate 25 mg/mL injectable solution: 25 milligram(s) injectable 2 times a month  folic acid 1 mg oral tablet: 1 tab(s) orally once a day  glycopyrrolate 1 mg oral tablet: 1 tab(s) orally 2 times a day  Lipitor 10 mg oral tablet: 1 tab(s) orally once a day  melatonin 10 mg oral tablet: 1 tab(s) orally once a day (at bedtime)  metoprolol succinate 25 mg oral tablet, extended release: 1 tab(s) orally once a day  oxyBUTYnin 5 mg oral tablet: 1 tab(s) orally once a day  Prinivil 5 mg oral tablet: 1 tab(s) orally once a day  Protonix 40 mg oral delayed release tablet: 1 tab(s) orally 2 times a day  PROzac 10 mg oral tablet: 1 tab(s) orally once a day   amantadine 100 mg oral tablet: 1 tab(s) orally once a day  Amaryl 1 mg oral tablet: 1 tab(s) orally 2 times a day  Aspir 81 oral delayed release tablet: 1 tab(s) orally once a day  benztropine 0.5 mg oral tablet: 1 tab(s) orally once a day  cephalexin 500 mg oral capsule: 1 cap(s) orally every 12 hours  ferrous sulfate 325 mg (65 mg elemental iron) oral tablet: 1 tab(s) orally once a day  fluPHENAZine decanoate 25 mg/mL injectable solution: 25 milligram(s) intramuscularly every 2 weeks  folic acid 1 mg oral tablet: 1 tab(s) orally once a day  glycopyrrolate 1 mg oral tablet: 1 tab(s) orally 2 times a day  Lipitor 10 mg oral tablet: 1 tab(s) orally once a day  melatonin 10 mg oral tablet: 1 tab(s) orally once a day (at bedtime)  metoprolol succinate 25 mg oral tablet, extended release: 1 tab(s) orally once a day  oxyBUTYnin 5 mg oral tablet: 1 tab(s) orally once a day  Prinivil 5 mg oral tablet: 1 tab(s) orally once a day  Protonix 40 mg oral delayed release tablet: 1 tab(s) orally 2 times a day  PROzac 10 mg oral tablet: 1 tab(s) orally once a day  torsemide 20 mg oral tablet: 1 tab(s) orally once a day

## 2023-09-22 NOTE — PROGRESS NOTE ADULT - ASSESSMENT
73 yo female with pmh of HLD, CAD, anemia, schizophrenia, PPM (indication not clear), active smoker now presents with shortness of breath and dizziness. She has been having intermittent dizziness and GRIFFIN for the past 5 days.     Acute HFrEF  CAD / DL  Schizophrenia  Hyponatremia  CKD-3  Tobacco use             PLAN:    ·	Tele reviewed. No events  ·	Having cough today. Will order Covid-19 PCR  ·	CE x 3 are negative  ·	S/P battery change of the PPM on 9/19  ·	ECHO reviewed. EF is 35-40%. Moderate to severe TR. Dilated IVC. Respiratory size variation <50%  ·	CXR on 9/19 showed stable diffuse interstitial opacities. Repeat  cxr form today was read as ILD. Will do high resolution CT to r/o ILD  ·	Looks euvolemic. Cont Torsemide 20 mg po daily  ·	Check i's and o's and daily wt.   ·	Low salt diet and water restriction to 1.5 L/D  ·	Monitor electrolytes and renal function. Replete K  ·	On GDMT. Cont Lisinopril and Metoprolol Succinate  ·	Cont her other home meds.   ·	Nuclear stress test is negative for ischemia.   ·	If the Covid-19 PCR is negative, can d/c her back to her facility    Progress Note Handoff    Pending (specify):  Consults_________, Tests________, Test Results_______, Other_Anticipate d/c in AM________  Family discussion:  Disposition: Home___/SNF___/Other________/Unknown at this time________    Chucky Mckinley MD  Spectra: [phone]

## 2023-09-22 NOTE — PROGRESS NOTE ADULT - SUBJECTIVE AND OBJECTIVE BOX
[place], [place]  72y Female    CHIEF COMPLAINT:    Patient is a 72y old  Female who presents with a chief complaint of Shortness of breath and Dizziness (22 Sep 2023 14:16)      INTERVAL HPI/OVERNIGHT EVENTS:    Patient seen and examined. Poor historian. Poor historian. Having cough. No fever.     ROS: All other systems are negative.    Vital Signs:    T(F): 94.2 (23 @ 12:32), Max: 97.4 (23 @ 20:01)  HR: 62 (23 @ 12:32) (53 - 62)  BP: 121/56 (23 @ 12:32) (112/62 - 131/58)  RR: 18 (23 @ 12:32) (18 - 18)  SpO2: 97% (23 @ 20:01) (97% - 97%)  I&O's Summary    21 Sep 2023 07:  -  22 Sep 2023 07:00  --------------------------------------------------------  IN: 560 mL / OUT: 1050 mL / NET: -490 mL    22 Sep 2023 07:01  -  22 Sep 2023 15:05  --------------------------------------------------------  IN: 220 mL / OUT: 481 mL / NET: -261 mL      Daily     Daily Weight in k (22 Sep 2023 04:27)  CAPILLARY BLOOD GLUCOSE          PHYSICAL EXAM:    GENERAL:  NAD  SKIN: No rashes or lesions  HENT: Atraumatic. Normocephalic. PERRL. Moist membranes.  NECK: Supple, No JVD. No lymphadenopathy.  PULMONARY: +ve fine rales in the bases B/L. No wheezing.   CVS: Normal S1, S2. Rate and Rhythm are regular. No murmurs.  ABDOMEN/GI: Soft, Nontender, Nondistended; BS present  EXTREMITIES: Peripheral pulses intact. No edema B/L LE.  NEUROLOGIC:  No motor or sensory deficit.  PSYCH: Alert & oriented x 3    Consultant(s) Notes Reviewed:  [x ] YES  [ ] NO  Care Discussed with Consultants/Other Providers [ x] YES  [ ] NO    EKG reviewed  Telemetry reviewed    LABS:                        14.5   7.61  )-----------( 153      ( 22 Sep 2023 06:20 )             44.3         134<L>  |  91<L>  |  25<H>  ----------------------------<  135<H>  3.4<L>   |  31  |  0.8    Ca    8.6      22 Sep 2023 06:20  Mg     2.0         TPro  6.9  /  Alb  3.2<L>  /  TBili  2.2<H>  /  DBili  x   /  AST  37  /  ALT  14  /  AlkPhos  156<H>                RADIOLOGY & ADDITIONAL TESTS:    < from: Xray Chest 1 View- PORTABLE-Urgent (Xray Chest 1 View- PORTABLE-Urgent .) (23 @ 09:13) >    Impression:    Interstitial lung disease.    < end of copied text >    Imaging or report Personally Reviewed:  [x ] YES  [ ] NO    Medications:  Standing  amantadine 100 milliGRAM(s) Oral daily  aspirin enteric coated 81 milliGRAM(s) Oral daily  atorvastatin 10 milliGRAM(s) Oral at bedtime  benztropine 0.5 milliGRAM(s) Oral daily  cephalexin 500 milliGRAM(s) Oral every 12 hours  FLUoxetine 10 milliGRAM(s) Oral daily  folic acid 1 milliGRAM(s) Oral daily  lisinopril 5 milliGRAM(s) Oral daily  metoprolol succinate ER 25 milliGRAM(s) Oral daily  oxybutynin XL 5 milliGRAM(s) Oral daily  pantoprazole    Tablet 40 milliGRAM(s) Oral two times a day  regadenoson Injectable 0.4 milliGRAM(s) IV Push once  torsemide 20 milliGRAM(s) Oral daily    PRN Meds  acetaminophen     Tablet .. 650 milliGRAM(s) Oral every 6 hours PRN  aluminum hydroxide/magnesium hydroxide/simethicone Suspension 30 milliLiter(s) Oral every 4 hours PRN  melatonin 3 milliGRAM(s) Oral at bedtime PRN  ondansetron Injectable 4 milliGRAM(s) IV Push every 8 hours PRN      Case discussed with resident    Care discussed with pt/family

## 2023-09-22 NOTE — PHARMACOTHERAPY INTERVENTION NOTE - COMMENTS
Based on last updated discharge reconcilliation, noted missing ferrous fumarate and polytrim eyedrop orders, metoprolol succinate listed instead of tartrate, and fluphenazine enathate listed instead of decanoate. 
Patient with HF on STAR list and at increased risk for hospital readmission. Conducted medication reconciliation based on med list from [facility]. Per list, patient was taking ferrous fumarate (not sulfate) 325mg daily, metoprolol tartrate (not succinate) 25mg daily, fluphenazine dec (not enathate) 25mg Q2 weeks, and polytrim 10,000 units both eyes Q4hrs. Updated outpatient medical records as appropriate.      Called [facility] at [phone] to confirm prescriptions are delivered to [facility], where patient receives medication and care.     
Patient with HF on STAR list and at increased risk for hospital readmission. Counseled patient on expected side effects and monitoring parameters for home medications lisinopril and metoprolol as well as torsemide, to be added pending discharge. Provided medication information sheets per [facility]. Patient was appreciative for pharmacy follow up.

## 2023-09-22 NOTE — PROGRESS NOTE ADULT - ASSESSMENT
73 yo female with pmh of HLD, CAD, anemia, schizophrenia, PPM (indication not clear), active smoker now presents with shortness of breath and dizziness. She has been having intermittent dizziness and GRIFFIN for the past 5 days. Seen by EP and cardio.    #HFrEF- unknown new - echo 9/16-> ef of 35-40%- EKG and trop unremarkable   #severe TR regurg  #h/o CAD   #h/o PPM - paced rhythm now  - CXR and echo reviewed   - has been on lasix 40 mg BID - will DC with torsemide 20 daily  - daily weight   - monitor Is and Os   - cardio following   - c/w aspirin, toprol and lisinopril  -EP replaced generator; will follow up in 6 months regarding need for AICD  -keflex q12 X 5 days s/p procedure   -stress test wnl    #Cough  -Procal and RVP neg  -Increased cough  -CXR - ILD  -Covid and High dose CT chest    #Schizophrenia   - c/w home meds    - qtc 548, pt has PPM     #Hypokalemia  #hypomagnesemia   -repleted      Diet - soft and bite sized  DVT ppx - Lovenox  Dispo - [facility]

## 2023-09-22 NOTE — PROGRESS NOTE ADULT - SUBJECTIVE AND OBJECTIVE BOX
24H events:    Patient is a 72y old Female who presents with a chief complaint of Shortness of breath and Dizziness (21 Sep 2023 15:05)    Primary diagnosis of Acute CHF      Day 1:      Today is 7d of hospitalization. This morning patient was seen and examined at bedside, resting comfortably in bed.    No acute or major events overnight. Patient denies fevers, chills, headache, chest pain, dyspnea, cough, nausea, vomiting, abdominal pain or BM changes.     Code Status:    Family communication:  Contact date:  Name of person contacted:  Relationship to patient:  Communication details:  What matters most:    PAST MEDICAL & SURGICAL HISTORY  Acute schizophrenia    CAD, multiple vessel    Acute MI    Abnormal cholesterol test    Absolute anemia    Artificial pacemaker      SOCIAL HISTORY:  Social History:      ALLERGIES:  eggs (Rash)  No Known Drug Allergies    MEDICATIONS:  STANDING MEDICATIONS  amantadine 100 milliGRAM(s) Oral daily  aspirin enteric coated 81 milliGRAM(s) Oral daily  atorvastatin 10 milliGRAM(s) Oral at bedtime  benztropine 0.5 milliGRAM(s) Oral daily  cephalexin 500 milliGRAM(s) Oral every 12 hours  FLUoxetine 10 milliGRAM(s) Oral daily  folic acid 1 milliGRAM(s) Oral daily  lisinopril 5 milliGRAM(s) Oral daily  metoprolol succinate ER 25 milliGRAM(s) Oral daily  oxybutynin XL 5 milliGRAM(s) Oral daily  pantoprazole    Tablet 40 milliGRAM(s) Oral two times a day  regadenoson Injectable 0.4 milliGRAM(s) IV Push once  torsemide 20 milliGRAM(s) Oral daily    PRN MEDICATIONS  acetaminophen     Tablet .. 650 milliGRAM(s) Oral every 6 hours PRN  aluminum hydroxide/magnesium hydroxide/simethicone Suspension 30 milliLiter(s) Oral every 4 hours PRN  melatonin 3 milliGRAM(s) Oral at bedtime PRN  ondansetron Injectable 4 milliGRAM(s) IV Push every 8 hours PRN    VITALS:   T(F): 94.2  HR: 62  BP: 121/56  RR: 18  SpO2: 97%    PHYSICAL EXAM:  GENERAL:   ( x) NAD, lying in bed comfortably     (  ) obtunded     (  ) lethargic     (  ) somnolent    HEAD:   ( x) Atraumatic     (  ) hematoma     (  ) laceration (specify location:       )     NECK:  (x) Supple     (  ) neck stiffness     (  ) nuchal rigidity     (  )  no JVD     (  ) JVD present ( -- cm)    HEART:  Rate -->     (x) normal rate     (  ) bradycardic     (  ) tachycardic  Rhythm -->     (x) regular     (  ) regularly irregular     (  ) irregularly irregular  Murmurs -->     (x) normal s1s2     (  ) systolic murmur     (  ) diastolic murmur     (  ) continuous murmur      (  ) S3 present     (  ) S4 present    LUNGS: cough worsened  ( x)Unlabored respirations     (  ) tachypnea  ( x) B/L air entry     (  ) decreased breath sounds in:  (location     )    ( ) no adventitious sound     ( X) crackles     (  ) wheezing      (  ) rhonchi      (specify location:       )  (  ) chest wall tenderness (specify location:       )    ABDOMEN:   ( x) Soft     (  ) tense   |   (X  ) nondistended     (  ) distended   |   ( X ) +BS     (  ) hypoactive bowel sounds     (  ) hyperactive bowel sounds  ( x) nontender     (  ) RUQ tenderness     (  ) RLQ tenderness     (  ) LLQ tenderness     (  ) epigastric tenderness     (  ) diffuse tenderness  (  ) Splenomegaly      (  ) Hepatomegaly      (  ) Jaundice     (  ) ecchymosis     EXTREMITIES:  ( x) Normal     (  ) Rash     (  ) ecchymosis     (  ) varicose veins      (  ) pitting edema     (  ) non-pitting edema   (  ) ulceration     (  ) gangrene:     (location:     )    NERVOUS SYSTEM:    ( x) A&Ox3     (  ) confused     (  ) lethargic  CN II-XII:     (  ) Intact     (  ) deficits found     (Specify:     )   Upper extremities:     (  ) no sensorimotor deficits     (  ) weakness     (  ) loss of proprioception/vibration     (  ) loss of touch/temperature (specify:    )  Lower extremities:     (  ) no sensorimotor deficits     (  ) weakness     (  ) loss of proprioception/vibration     (  ) loss of touch/temperature (specify:    )      LABS:                        14.5   7.61  )-----------( 153      ( 22 Sep 2023 06:20 )             44.3     09-22    134<L>  |  91<L>  |  25<H>  ----------------------------<  135<H>  3.4<L>   |  31  |  0.8    Ca    8.6      22 Sep 2023 06:20  Mg     2.0     09-22    TPro  6.9  /  Alb  3.2<L>  /  TBili  2.2<H>  /  DBili  x   /  AST  37  /  ALT  14  /  AlkPhos  156<H>  09-22      Urinalysis Basic - ( 22 Sep 2023 06:20 )    Color: x / Appearance: x / SG: x / pH: x  Gluc: 135 mg/dL / Ketone: x  / Bili: x / Urobili: x   Blood: x / Protein: x / Nitrite: x   Leuk Esterase: x / RBC: x / WBC x   Sq Epi: x / Non Sq Epi: x / Bacteria: x                RADIOLOGY:    CXR 9/22 - Interstitial lung disease.               24H events:    Patient is a 72y old Female who presents with a chief complaint of Shortness of breath and Dizziness (21 Sep 2023 15:05)    Primary diagnosis of Acute CHF      Today is 7d of hospitalization. This morning patient was seen and examined at bedside, resting comfortably in bed.    No acute or major events overnight. Patient denies fevers, chills, headache, chest pain, dyspnea, nausea, vomiting, abdominal pain or BM changes.   Increased cough.    Family communication:  Contact date:  Name of person contacted:  Relationship to patient:  Communication details:  What matters most:    PAST MEDICAL & SURGICAL HISTORY  Acute schizophrenia    CAD, multiple vessel    Acute MI    Abnormal cholesterol test    Absolute anemia    Artificial pacemaker        ALLERGIES:  eggs (Rash)  No Known Drug Allergies    MEDICATIONS:  STANDING MEDICATIONS  amantadine 100 milliGRAM(s) Oral daily  aspirin enteric coated 81 milliGRAM(s) Oral daily  atorvastatin 10 milliGRAM(s) Oral at bedtime  benztropine 0.5 milliGRAM(s) Oral daily  cephalexin 500 milliGRAM(s) Oral every 12 hours  FLUoxetine 10 milliGRAM(s) Oral daily  folic acid 1 milliGRAM(s) Oral daily  lisinopril 5 milliGRAM(s) Oral daily  metoprolol succinate ER 25 milliGRAM(s) Oral daily  oxybutynin XL 5 milliGRAM(s) Oral daily  pantoprazole    Tablet 40 milliGRAM(s) Oral two times a day  regadenoson Injectable 0.4 milliGRAM(s) IV Push once  torsemide 20 milliGRAM(s) Oral daily    PRN MEDICATIONS  acetaminophen     Tablet .. 650 milliGRAM(s) Oral every 6 hours PRN  aluminum hydroxide/magnesium hydroxide/simethicone Suspension 30 milliLiter(s) Oral every 4 hours PRN  melatonin 3 milliGRAM(s) Oral at bedtime PRN  ondansetron Injectable 4 milliGRAM(s) IV Push every 8 hours PRN    VITALS:   T(F): 94.2  HR: 62  BP: 121/56  RR: 18  SpO2: 97%    PHYSICAL EXAM:  GENERAL:   ( x) NAD, lying in bed comfortably     (  ) obtunded     (  ) lethargic     (  ) somnolent    HEAD:   ( x) Atraumatic     (  ) hematoma     (  ) laceration (specify location:       )     NECK:  (x) Supple     (  ) neck stiffness     (  ) nuchal rigidity     (  )  no JVD     (  ) JVD present ( -- cm)    HEART:  Rate -->     (x) normal rate     (  ) bradycardic     (  ) tachycardic  Rhythm -->     (x) regular     (  ) regularly irregular     (  ) irregularly irregular  Murmurs -->     (x) normal s1s2     (  ) systolic murmur     (  ) diastolic murmur     (  ) continuous murmur      (  ) S3 present     (  ) S4 present    LUNGS: cough worsened  ( x)Unlabored respirations     (  ) tachypnea  ( x) B/L air entry     (  ) decreased breath sounds in:  (location     )    ( ) no adventitious sound     ( X) crackles     (  ) wheezing      (  ) rhonchi      (specify location:       )  (  ) chest wall tenderness (specify location:       )    ABDOMEN:   ( x) Soft     (  ) tense   |   (X  ) nondistended     (  ) distended   |   ( X ) +BS     (  ) hypoactive bowel sounds     (  ) hyperactive bowel sounds  ( x) nontender     (  ) RUQ tenderness     (  ) RLQ tenderness     (  ) LLQ tenderness     (  ) epigastric tenderness     (  ) diffuse tenderness  (  ) Splenomegaly      (  ) Hepatomegaly      (  ) Jaundice     (  ) ecchymosis     EXTREMITIES:  ( x) Normal     (  ) Rash     (  ) ecchymosis     (  ) varicose veins      (  ) pitting edema     (  ) non-pitting edema   (  ) ulceration     (  ) gangrene:     (location:     )    NERVOUS SYSTEM:    ( x) A&Ox3     (  ) confused     (  ) lethargic  CN II-XII:     (  ) Intact     (  ) deficits found     (Specify:     )   Upper extremities:     (  ) no sensorimotor deficits     (  ) weakness     (  ) loss of proprioception/vibration     (  ) loss of touch/temperature (specify:    )  Lower extremities:     (  ) no sensorimotor deficits     (  ) weakness     (  ) loss of proprioception/vibration     (  ) loss of touch/temperature (specify:    )      LABS:                        14.5   7.61  )-----------( 153      ( 22 Sep 2023 06:20 )             44.3     09-22    134<L>  |  91<L>  |  25<H>  ----------------------------<  135<H>  3.4<L>   |  31  |  0.8    Ca    8.6      22 Sep 2023 06:20  Mg     2.0     09-22    TPro  6.9  /  Alb  3.2<L>  /  TBili  2.2<H>  /  DBili  x   /  AST  37  /  ALT  14  /  AlkPhos  156<H>  09-22      Urinalysis Basic - ( 22 Sep 2023 06:20 )    Color: x / Appearance: x / SG: x / pH: x  Gluc: 135 mg/dL / Ketone: x  / Bili: x / Urobili: x   Blood: x / Protein: x / Nitrite: x   Leuk Esterase: x / RBC: x / WBC x   Sq Epi: x / Non Sq Epi: x / Bacteria: x                RADIOLOGY:    CXR 9/22 - Interstitial lung disease.

## 2023-09-23 NOTE — PROGRESS NOTE ADULT - SUBJECTIVE AND OBJECTIVE BOX
Patient is a 72y old  Female who presents with a chief complaint of Shortness of breath and Dizziness (23 Sep 2023 09:38)      OVERNIGHT EVENTS: + mild cough, other ROS neg    SUBJECTIVE / INTERVAL HPI: Patient seen and examined at bedside.     VITAL SIGNS:  Vital Signs Last 24 Hrs  T(C): 36.1 (23 Sep 2023 12:32), Max: 36.3 (22 Sep 2023 20:39)  T(F): 97 (23 Sep 2023 12:32), Max: 97.4 (22 Sep 2023 20:39)  HR: 64 (23 Sep 2023 12:32) (60 - 65)  BP: 117/56 (23 Sep 2023 12:32) (117/56 - 142/63)  BP(mean): 95 (23 Sep 2023 05:46) (95 - 95)  RR: 18 (23 Sep 2023 12:32) (18 - 18)  SpO2: --        PHYSICAL EXAM:    General: old lady chronically look ill   HEENT: NC/AT; PERRL, clear conjunctiva  Neck: supple  Cardiovascular: +S1/S2; RRR  Respiratory: CTA b/l; no W/R/R  Gastrointestinal: soft, NT/ND; +BSx4  Extremities: WWP; 2+ peripheral pulses; no edema   Neurological: AAOx1; globally weak, o focal deficits    MEDICATIONS:  MEDICATIONS  (STANDING):  amantadine 100 milliGRAM(s) Oral daily  aspirin enteric coated 81 milliGRAM(s) Oral daily  atorvastatin 10 milliGRAM(s) Oral at bedtime  benztropine 0.5 milliGRAM(s) Oral daily  cephalexin 500 milliGRAM(s) Oral every 12 hours  FLUoxetine 10 milliGRAM(s) Oral daily  folic acid 1 milliGRAM(s) Oral daily  heparin   Injectable 5000 Unit(s) SubCutaneous every 12 hours  lisinopril 5 milliGRAM(s) Oral daily  metoprolol succinate ER 25 milliGRAM(s) Oral daily  oxybutynin XL 5 milliGRAM(s) Oral daily  pantoprazole    Tablet 40 milliGRAM(s) Oral two times a day  torsemide 20 milliGRAM(s) Oral daily    MEDICATIONS  (PRN):  acetaminophen     Tablet .. 650 milliGRAM(s) Oral every 6 hours PRN Temp greater or equal to 38C (100.4F), Mild Pain (1 - 3)  aluminum hydroxide/magnesium hydroxide/simethicone Suspension 30 milliLiter(s) Oral every 4 hours PRN Dyspepsia  melatonin 3 milliGRAM(s) Oral at bedtime PRN Insomnia  ondansetron Injectable 4 milliGRAM(s) IV Push every 8 hours PRN Nausea and/or Vomiting      ALLERGIES:  Allergies    eggs (Rash)  No Known Drug Allergies    Intolerances        LABS:                        14.6   5.76  )-----------( 159      ( 23 Sep 2023 06:26 )             44.5     09-23    132<L>  |  93<L>  |  22<H>  ----------------------------<  131<H>  3.7   |  27  |  0.7    Ca    8.3<L>      23 Sep 2023 06:26  Mg     1.7     09-23    TPro  6.9  /  Alb  3.1<L>  /  TBili  1.6<H>  /  DBili  x   /  AST  40  /  ALT  17  /  AlkPhos  154<H>  09-23      Urinalysis Basic - ( 23 Sep 2023 06:26 )    Color: x / Appearance: x / SG: x / pH: x  Gluc: 131 mg/dL / Ketone: x  / Bili: x / Urobili: x   Blood: x / Protein: x / Nitrite: x   Leuk Esterase: x / RBC: x / WBC x   Sq Epi: x / Non Sq Epi: x / Bacteria: x      CAPILLARY BLOOD GLUCOSE          RADIOLOGY & ADDITIONAL TESTS: Reviewed.    ASSESSMENT:    PLAN:

## 2023-09-23 NOTE — DISCHARGE NOTE NURSING/CASE MANAGEMENT/SOCIAL WORK - PATIENT PORTAL LINK FT
You can access the FollowMyHealth Patient Portal offered by [facility] by registering at the following website: [email or web address]. By joining [facility]’s FollowMyHealth portal, you will also be able to view your health information using other applications (apps) compatible with our system.

## 2023-09-23 NOTE — PROGRESS NOTE ADULT - ASSESSMENT
73 yo female with pmh of HLD, CAD, anemia, schizophrenia, PPM (indication not clear), active smoker presents with shortness of breath and dizziness.   She has been having intermittent dizziness and GRIFFIN for the past 5 days.     Acute HFrEF  new ILD on CT chest and dilated esophagus, r/u SC    CAD / DL  Schizophrenia  Hyponatremia  CKD-3  Tobacco use             PLAN:    Tele reviewed. No events  covid neg  CE x 3 are negative  S/P battery change of the PPM on 9/19  ECHO reviewed. EF is 35-40%. seen by EP, not clear if NICM new or old, stress test neg, probaply denycronized related given low PPM battery, on GDM  CXR suspicious for ILD, CT chest UIP pattern   Check i's and o's and daily wt.   Low salt diet and water restriction to 1.5 L/D  Monitor electrolytes and renal function. Replete K  Cont her other home meds.    73 yo female with pmh of HLD, CAD, anemia, schizophrenia, PPM (indication not clear), active smoker presents with shortness of breath and dizziness.   She has been having intermittent dizziness and GRIFFIN for the past 5 days.     Acute HFrEF  new ILD on CT chest and dilated esophagus, suspected SC    CAD / DL  Schizophrenia  Hyponatremia  CKD-3  Tobacco use             PLAN:    Tele reviewed. No events  covid neg  CE x 3 are negative  S/P battery change of the PPM on 9/19  ECHO reviewed. EF is 35-40%. neg stress test, seen by EP, not clear if NICM new or old, stress test neg, probably desynchronized related given low PPM battery, on GDM  needs repeat echo after DC  CXR suspicious for ILD, CT chest UIP pattern, with dilated esophagus, need outpt f/iu w GI and Rheum   daily wt.   Low salt diet and water restriction to 1.5 L/D  Monitor electrolytes and renal function. Replete K  DC plan to NH

## 2023-09-23 NOTE — DISCHARGE NOTE NURSING/CASE MANAGEMENT/SOCIAL WORK - NSDCPEFALRISK_GEN_ALL_CORE
For information on Fall & Injury Prevention, visit: [email or web address] OR  [email or web address] OR  https://www.cdc.gov/steadi/patient.html

## 2023-09-24 NOTE — PROGRESS NOTE ADULT - REASON FOR ADMISSION
Shortness of breath and Dizziness

## 2023-09-24 NOTE — PROGRESS NOTE ADULT - SUBJECTIVE AND OBJECTIVE BOX
SARAH VERGARA 72y Female  MRN#: [id number]   Hospital Day: 9d    SUBJECTIVE  Patient is a 72y old Female who presents with a chief complaint of Shortness of breath and Dizziness (23 Sep 2023 09:38)  Currently admitted to medicine with the primary diagnosis of Acute CHF      INTERVAL HPI AND OVERNIGHT EVENTS:  Patient was examined and seen at bedside. This morning she is resting comfortably in bed and reports no issues or overnight events.   denies chest pain, sob, n/v.    REVIEW OF SYMPTOMS:  negative except as above     OBJECTIVE  PAST MEDICAL & SURGICAL HISTORY  Acute schizophrenia    CAD, multiple vessel    Acute MI    Abnormal cholesterol test    Absolute anemia    Artificial pacemaker      ALLERGIES:  eggs (Rash)  No Known Drug Allergies    MEDICATIONS:  STANDING MEDICATIONS  amantadine 100 milliGRAM(s) Oral daily  aspirin enteric coated 81 milliGRAM(s) Oral daily  atorvastatin 10 milliGRAM(s) Oral at bedtime  benztropine 0.5 milliGRAM(s) Oral daily  FLUoxetine 10 milliGRAM(s) Oral daily  folic acid 1 milliGRAM(s) Oral daily  heparin   Injectable 5000 Unit(s) SubCutaneous every 12 hours  lisinopril 5 milliGRAM(s) Oral daily  metoprolol succinate ER 25 milliGRAM(s) Oral daily  oxybutynin XL 5 milliGRAM(s) Oral daily  pantoprazole    Tablet 40 milliGRAM(s) Oral two times a day  torsemide 20 milliGRAM(s) Oral daily    PRN MEDICATIONS  acetaminophen     Tablet .. 650 milliGRAM(s) Oral every 6 hours PRN  aluminum hydroxide/magnesium hydroxide/simethicone Suspension 30 milliLiter(s) Oral every 4 hours PRN  melatonin 3 milliGRAM(s) Oral at bedtime PRN  ondansetron Injectable 4 milliGRAM(s) IV Push every 8 hours PRN      VITAL SIGNS: Last 24 Hours  T(C): 36.1 (24 Sep 2023 05:20), Max: 36.1 (24 Sep 2023 05:20)  T(F): 97 (24 Sep 2023 05:20), Max: 97 (24 Sep 2023 05:20)  HR: 63 (24 Sep 2023 05:20) (63 - 63)  BP: 137/89 (24 Sep 2023 05:20) (137/89 - 137/89)  BP(mean): --  RR: 19 (24 Sep 2023 05:20) (19 - 19)  SpO2: --    PE:  General: old lady chronically Ill patient   HEENT: NC/AT  Neck: supple  Cardiovascular: +S1/S2; RRR  Respiratory: CTA b/l; no W/R/R  Gastrointestinal: soft, NT / ND; +BSx4  Extremities: 2+ peripheral pulses; no edema   Neurological: AAOx1; globally weak, focal deficits    LABS:                        14.9   4.86  )-----------( 235      ( 24 Sep 2023 05:36 )             45.6     09-24    130<L>  |  93<L>  |  18  ----------------------------<  108<H>  3.7   |  26  |  0.7    Ca    8.4      24 Sep 2023 05:36  Mg     1.8     09-24    TPro  7.0  /  Alb  3.2<L>  /  TBili  1.4<H>  /  DBili  x   /  AST  59<H>  /  ALT  24  /  AlkPhos  179<H>  09-24      Urinalysis Basic - ( 24 Sep 2023 05:36 )    Color: x / Appearance: x / SG: x / pH: x  Gluc: 108 mg/dL / Ketone: x  / Bili: x / Urobili: x   Blood: x / Protein: x / Nitrite: x   Leuk Esterase: x / RBC: x / WBC x   Sq Epi: x / Non Sq Epi: x / Bacteria: x

## 2023-09-24 NOTE — PROGRESS NOTE ADULT - ASSESSMENT
a 73 yo female with pmh of HLD, CAD, anemia, schizophrenia, PPM (indication not clear), active smoker presents with shortness of breath and dizziness. She has been having intermittent dizziness and GRIFFIN for the past 5 days.  endorsed patient can go back today.       Acute HFrEF  new ILD on CT chest and dilated esophagus, suspected SC   CAD / DL  Schizophrenia  Hyponatremia  CKD-3  Tobacco use    PLAN:  covid neg  CE x 3 are negative  Tele reviewed. No events  S/P battery change of the PPM on 9/19  ECHO reviewed. EF is 35-40%. neg stress test, seen by EP, not clear if NICM new or old, stress test neg, probably desynchronized related given low PPM battery, on GDM  needs repeat echo after DC  CXR suspicious for ILD, CT chest UIP pattern, with dilated esophagus, need outpt f/u w GI and Rheum   daily wt.   Low salt diet and water restriction to 1.5 L/D  Monitor electrolytes and renal function. Replete K    DC plan to NH

## 2023-09-24 NOTE — PROGRESS NOTE ADULT - PROVIDER SPECIALTY LIST ADULT
Hospitalist
Hospitalist
Internal Medicine
Electrophysiology
Hospitalist
Internal Medicine
Internal Medicine
Hospitalist
Hospitalist
Internal Medicine

## 2023-12-28 ENCOUNTER — APPOINTMENT (OUTPATIENT)
Dept: CARDIOLOGY | Facility: CLINIC | Age: 73
End: 2023-12-28

## 2024-01-23 NOTE — PHARMACOTHERAPY INTERVENTION NOTE - INTERVENTION CATEGORIES
Misc
Med Reconciliation
Med Reconciliation
[As Noted in HPI] : as noted in HPI
[Negative] : Heme/Lymph

## 2024-07-30 NOTE — PRE-ANESTHESIA EVALUATION ADULT - NSANTHALCOHOLSD_GEN_ALL_CORE
Prior Authorization Not Needed per Insurance    Medication: AMJEVITA 40 MG/0.4ML SC SOAJ  Insurance Company: [employer] ([facility]) - Phone [phone] Fax [phone]  Expected CoPay: $    Pharmacy Filling the Rx: [facility] - [place], [place] - [address] [place] [address]  Pharmacy Notified: New order needed for: AMJEVITA 40 MG/0.4ML SC SOAJ  Patient Notified: New order needed for: AMJEVITA 40 MG/0.4ML SC SOAJ        PA approval on file through: 01/18/2025        Thank You,     Snehal [facility]  Special[facility]acy [facility]  [email or web address]  [email or web address]  Phone: [phone]  Fax: [phone]     No